# Patient Record
Sex: MALE | Race: BLACK OR AFRICAN AMERICAN | Employment: UNEMPLOYED | URBAN - METROPOLITAN AREA
[De-identification: names, ages, dates, MRNs, and addresses within clinical notes are randomized per-mention and may not be internally consistent; named-entity substitution may affect disease eponyms.]

---

## 2019-01-18 ENCOUNTER — HOSPITAL ENCOUNTER (EMERGENCY)
Age: 34
Discharge: HOME | End: 2019-01-19
Attending: EMERGENCY MEDICINE

## 2019-01-18 VITALS
HEART RATE: 72 BPM | WEIGHT: 180 LBS | OXYGEN SATURATION: 100 % | DIASTOLIC BLOOD PRESSURE: 77 MMHG | BODY MASS INDEX: 25.2 KG/M2 | SYSTOLIC BLOOD PRESSURE: 156 MMHG | HEIGHT: 71 IN | RESPIRATION RATE: 14 BRPM | TEMPERATURE: 97.6 F

## 2019-01-18 DIAGNOSIS — A60.01 HERPES SIMPLEX INFECTION OF PENIS: Primary | ICD-10-CM

## 2019-01-18 PROCEDURE — 99283 EMERGENCY DEPT VISIT LOW MDM: CPT

## 2019-01-18 PROCEDURE — 6370000000 HC RX 637 (ALT 250 FOR IP): Performed by: EMERGENCY MEDICINE

## 2019-01-18 RX ORDER — ACYCLOVIR 200 MG/1
200 CAPSULE ORAL ONCE
Status: COMPLETED | OUTPATIENT
Start: 2019-01-18 | End: 2019-01-18

## 2019-01-18 RX ORDER — ACYCLOVIR 400 MG/1
400 TABLET ORAL
Qty: 50 TABLET | Refills: 0 | Status: SHIPPED | OUTPATIENT
Start: 2019-01-18 | End: 2019-01-28

## 2019-01-18 RX ADMIN — ACYCLOVIR 200 MG: 200 CAPSULE ORAL at 23:30

## 2019-01-18 ASSESSMENT — ENCOUNTER SYMPTOMS
VOMITING: 0
DIARRHEA: 0
SINUS PRESSURE: 0
ABDOMINAL PAIN: 0
EYE DISCHARGE: 0
EYE PAIN: 0
COUGH: 0
SHORTNESS OF BREATH: 0
BACK PAIN: 0
SORE THROAT: 0
EYE REDNESS: 0
WHEEZING: 0
NAUSEA: 0

## 2019-01-18 ASSESSMENT — PAIN DESCRIPTION - PAIN TYPE: TYPE: ACUTE PAIN

## 2019-01-18 ASSESSMENT — PAIN DESCRIPTION - LOCATION: LOCATION: PENIS

## 2019-01-18 ASSESSMENT — PAIN SCALES - GENERAL: PAINLEVEL_OUTOF10: 10

## 2019-06-16 ENCOUNTER — HOSPITAL ENCOUNTER (EMERGENCY)
Age: 34
Discharge: HOME | End: 2019-06-16
Payer: MEDICAID

## 2019-06-16 VITALS
SYSTOLIC BLOOD PRESSURE: 139 MMHG | DIASTOLIC BLOOD PRESSURE: 100 MMHG | OXYGEN SATURATION: 99 % | WEIGHT: 180 LBS | HEART RATE: 75 BPM | BODY MASS INDEX: 25.1 KG/M2 | TEMPERATURE: 97.7 F | RESPIRATION RATE: 18 BRPM

## 2019-06-16 DIAGNOSIS — B00.9 HERPES SIMPLEX: ICD-10-CM

## 2019-06-16 DIAGNOSIS — R09.81 NASAL CONGESTION WITH RHINORRHEA: Primary | ICD-10-CM

## 2019-06-16 DIAGNOSIS — J34.89 NASAL CONGESTION WITH RHINORRHEA: Primary | ICD-10-CM

## 2019-06-16 LAB
BILIRUBIN URINE: NEGATIVE
BLOOD, URINE: NEGATIVE
CLARITY: CLEAR
COLOR: YELLOW
GLUCOSE URINE: NEGATIVE MG/DL
KETONES, URINE: NEGATIVE MG/DL
LEUKOCYTE ESTERASE, URINE: NEGATIVE
NITRITE, URINE: NEGATIVE
PH UA: 6 (ref 5–9)
PROTEIN UA: NEGATIVE MG/DL
SPECIFIC GRAVITY UA: 1.01 (ref 1–1.03)
UROBILINOGEN, URINE: 1 E.U./DL

## 2019-06-16 PROCEDURE — 99283 EMERGENCY DEPT VISIT LOW MDM: CPT

## 2019-06-16 PROCEDURE — 87591 N.GONORRHOEAE DNA AMP PROB: CPT

## 2019-06-16 PROCEDURE — 81003 URINALYSIS AUTO W/O SCOPE: CPT

## 2019-06-16 PROCEDURE — 87491 CHLMYD TRACH DNA AMP PROBE: CPT

## 2019-06-16 RX ORDER — LORATADINE 10 MG/1
10 TABLET ORAL DAILY
Qty: 7 TABLET | Refills: 0 | Status: SHIPPED | OUTPATIENT
Start: 2019-06-16 | End: 2019-06-23

## 2019-06-16 RX ORDER — LORATADINE 10 MG/1
10 TABLET ORAL DAILY
Qty: 7 TABLET | Refills: 0 | Status: SHIPPED | OUTPATIENT
Start: 2019-06-16 | End: 2019-06-16 | Stop reason: SDUPTHER

## 2019-06-16 RX ORDER — FLUTICASONE PROPIONATE 50 MCG
1 SPRAY, SUSPENSION (ML) NASAL DAILY
Qty: 1 BOTTLE | Refills: 0 | Status: SHIPPED | OUTPATIENT
Start: 2019-06-16

## 2019-06-16 RX ORDER — ACYCLOVIR 400 MG/1
400 TABLET ORAL
Qty: 50 TABLET | Refills: 0 | Status: SHIPPED | OUTPATIENT
Start: 2019-06-16 | End: 2019-06-26

## 2019-06-16 RX ORDER — ACYCLOVIR 400 MG/1
400 TABLET ORAL
Qty: 50 TABLET | Refills: 0 | Status: SHIPPED | OUTPATIENT
Start: 2019-06-16 | End: 2019-06-16 | Stop reason: SDUPTHER

## 2019-06-16 RX ORDER — FLUTICASONE PROPIONATE 50 MCG
1 SPRAY, SUSPENSION (ML) NASAL DAILY
Qty: 1 BOTTLE | Refills: 0 | Status: SHIPPED | OUTPATIENT
Start: 2019-06-16 | End: 2019-06-16 | Stop reason: SDUPTHER

## 2019-06-16 ASSESSMENT — PAIN DESCRIPTION - LOCATION: LOCATION: PENIS

## 2019-06-16 ASSESSMENT — PAIN DESCRIPTION - DESCRIPTORS: DESCRIPTORS: DISCOMFORT

## 2019-06-19 LAB
C. TRACHOMATIS DNA ,URINE: NEGATIVE
N. GONORRHOEAE DNA, URINE: NEGATIVE
SOURCE: NORMAL

## 2019-06-19 NOTE — ED PROVIDER NOTES
[facility]       Department of Emergency Medicine   ED  Provider Note  Admit Date/RoomTime: 6/16/2019  4:53 PM  ED Room: [place]  MRN: [id number]  Chief Complaint: Nose Problem (clear nasal drainage for 40 days. ) and Sexually Transmitted Diseases (Pt wants treated for herpes. )       History of Present Illness   Source of history provided by:  patient. History/Exam Limitations: none. Brianna August is a 29 y.o. male who has a past medical history of:   Past Medical History:   Diagnosis Date    Herpes genitalis in men     presents to the ED by private car and is alone for nasal congestion and rash, beginning pta and are unchanged since that time. The complaint has been persistent, moderate in severity. States for the 40 days he has had nasal drainage from the left nares. States clear. Denies fever, chills, nausea, vomiting or cough. Also states he has a history of herpes. States flare started several days ago. ROS    Pertinent positives and negatives are stated within HPI, all other systems reviewed and are negative. History reviewed. No pertinent surgical history. Social History:  reports that he has quit smoking. His smoking use included cigarettes. He smoked 0.50 packs per day. He has never used smokeless tobacco. He reports that he does not drink alcohol or use drugs. Family History: family history is not on file. Allergies: Ciprofloxacin and Penicillins    Physical Exam   Oxygen Saturation Interpretation: Normal.   ED Triage Vitals   BP Temp Temp Source Pulse Resp SpO2 Height Weight   06/16/19 1651 06/16/19 1651 06/16/19 1651 06/16/19 1646 06/16/19 1651 06/16/19 1646 -- 06/16/19 1651   (!) 139/100 97.7 °F (36.5 °C) Temporal 71 18 98 %  180 lb (81.6 kg)       Physical Exam  · Constitutional/General: Alert and oriented x3, well appearing, non toxic  · HEENT:  NC/NT. PERRLA,  Airway patent.   · Neck: Supple, full ROM, non tender to palpation in the midline, no stridor, no crepitus, no meningeal signs  · Respiratory: Lungs clear to auscultation bilaterally, no wheezes, rales, or rhonchi. Not in respiratory distress  · CV:  Regular rate. Regular rhythm. No murmurs, gallops, or rubs. 2+ distal pulses  · Chest: No chest wall tenderness  · GI:  Abdomen Soft, Non tender, Non distended. +BS. No rebound, guarding, or rigidity. No pulsatile masses. · Musculoskeletal: Moves all extremities x 4. Warm and well perfused, no clubbing, cyanosis, or edema. Capillary refill <3 seconds  · Integument: skin warm and dry. No rashes. · Lymphatic: no lymphadenopathy noted  · Neurologic: GCS 15, no focal deficits, symmetric strength 5/5 in the upper and lower extremities bilaterally  · Psychiatric: Normal Affect    Lab / Imaging Results   (All laboratory and radiology results have been personally reviewed by myself)  Labs:  Results for orders placed or performed during the hospital encounter of 06/16/19   C.trachomatis N.gonorrhoeae DNA, Urine   Result Value Ref Range    Source Urine    Urinalysis   Result Value Ref Range    Color, UA Yellow Straw/Yellow    Clarity, UA Clear Clear    Glucose, Ur Negative Negative mg/dL    Bilirubin Urine Negative Negative    Ketones, Urine Negative Negative mg/dL    Specific Gravity, UA 1.015 1.005 - 1.030    Blood, Urine Negative Negative    pH, UA 6.0 5.0 - 9.0    Protein, UA Negative Negative mg/dL    Urobilinogen, Urine 1.0 <2.0 E.U./dL    Nitrite, Urine Negative Negative    Leukocyte Esterase, Urine Negative Negative     Imaging: All Radiology results interpreted by Radiologist unless otherwise noted. No orders to display       ED Course / Medical Decision Making   Medications - No data to display     Consult(s):   None    Procedure(s):   none    MDM:   Patient in no acute distress. Vital signs stable. Treated with acyclovir, flonase and claritin. Declined exam.  Patient will follow with PCP. Educated on the signs and symptoms to return to the ED. Discharged in stable condition.

## 2023-02-05 ENCOUNTER — APPOINTMENT (OUTPATIENT)
Dept: GENERAL RADIOLOGY | Age: 38
DRG: 313 | End: 2023-02-05
Payer: MEDICAID

## 2023-02-05 ENCOUNTER — HOSPITAL ENCOUNTER (INPATIENT)
Age: 38
LOS: 1 days | Discharge: HOME | DRG: 313 | End: 2023-02-06
Attending: EMERGENCY MEDICINE | Admitting: ORTHOPAEDIC SURGERY
Payer: MEDICAID

## 2023-02-05 ENCOUNTER — APPOINTMENT (OUTPATIENT)
Dept: CT IMAGING | Age: 38
DRG: 313 | End: 2023-02-05
Payer: MEDICAID

## 2023-02-05 DIAGNOSIS — S82.142A CLOSED FRACTURE OF LEFT TIBIAL PLATEAU, INITIAL ENCOUNTER: Primary | ICD-10-CM

## 2023-02-05 DIAGNOSIS — S82.142A TIBIAL PLATEAU FRACTURE, LEFT, CLOSED, INITIAL ENCOUNTER: ICD-10-CM

## 2023-02-05 PROCEDURE — 6360000002 HC RX W HCPCS: Performed by: NURSE PRACTITIONER

## 2023-02-05 PROCEDURE — 99254 IP/OBS CNSLTJ NEW/EST MOD 60: CPT | Performed by: ORTHOPAEDIC SURGERY

## 2023-02-05 PROCEDURE — 72125 CT NECK SPINE W/O DYE: CPT

## 2023-02-05 PROCEDURE — 96374 THER/PROPH/DIAG INJ IV PUSH: CPT

## 2023-02-05 PROCEDURE — 73700 CT LOWER EXTREMITY W/O DYE: CPT

## 2023-02-05 PROCEDURE — 73590 X-RAY EXAM OF LOWER LEG: CPT

## 2023-02-05 PROCEDURE — 96376 TX/PRO/DX INJ SAME DRUG ADON: CPT

## 2023-02-05 PROCEDURE — 73560 X-RAY EXAM OF KNEE 1 OR 2: CPT

## 2023-02-05 PROCEDURE — 73552 X-RAY EXAM OF FEMUR 2/>: CPT

## 2023-02-05 PROCEDURE — 99285 EMERGENCY DEPT VISIT HI MDM: CPT

## 2023-02-05 PROCEDURE — 70450 CT HEAD/BRAIN W/O DYE: CPT

## 2023-02-05 RX ORDER — METHOCARBAMOL 750 MG/1
750 TABLET, FILM COATED ORAL 4 TIMES DAILY
Status: DISCONTINUED | OUTPATIENT
Start: 2023-02-05 | End: 2023-02-10 | Stop reason: HOSPADM

## 2023-02-05 RX ORDER — OXYCODONE HYDROCHLORIDE 10 MG/1
10 TABLET ORAL EVERY 4 HOURS PRN
Status: DISCONTINUED | OUTPATIENT
Start: 2023-02-05 | End: 2023-02-10 | Stop reason: HOSPADM

## 2023-02-05 RX ORDER — OXYCODONE HYDROCHLORIDE 5 MG/1
5 TABLET ORAL EVERY 4 HOURS PRN
Status: DISCONTINUED | OUTPATIENT
Start: 2023-02-05 | End: 2023-02-10 | Stop reason: HOSPADM

## 2023-02-05 RX ORDER — FENTANYL CITRATE 50 UG/ML
50 INJECTION, SOLUTION INTRAMUSCULAR; INTRAVENOUS ONCE
Status: COMPLETED | OUTPATIENT
Start: 2023-02-05 | End: 2023-02-05

## 2023-02-05 RX ORDER — GABAPENTIN 100 MG/1
200 CAPSULE ORAL 3 TIMES DAILY
Status: DISCONTINUED | OUTPATIENT
Start: 2023-02-05 | End: 2023-02-10 | Stop reason: HOSPADM

## 2023-02-05 RX ADMIN — FENTANYL CITRATE 50 MCG: 0.05 INJECTION, SOLUTION INTRAMUSCULAR; INTRAVENOUS at 21:16

## 2023-02-05 RX ADMIN — FENTANYL CITRATE 50 MCG: 50 INJECTION, SOLUTION INTRAMUSCULAR; INTRAVENOUS at 22:58

## 2023-02-05 ASSESSMENT — PAIN - FUNCTIONAL ASSESSMENT
PAIN_FUNCTIONAL_ASSESSMENT: 0-10
PAIN_FUNCTIONAL_ASSESSMENT: 0-10

## 2023-02-05 ASSESSMENT — PAIN SCALES - GENERAL
PAINLEVEL_OUTOF10: 10
PAINLEVEL_OUTOF10: 10
PAINLEVEL_OUTOF10: 7

## 2023-02-05 ASSESSMENT — PAIN DESCRIPTION - ORIENTATION
ORIENTATION: LEFT

## 2023-02-05 ASSESSMENT — LIFESTYLE VARIABLES: HOW OFTEN DO YOU HAVE A DRINK CONTAINING ALCOHOL: NEVER

## 2023-02-05 ASSESSMENT — PAIN DESCRIPTION - LOCATION
LOCATION: KNEE

## 2023-02-06 ENCOUNTER — ANESTHESIA EVENT (OUTPATIENT)
Dept: OPERATING ROOM | Age: 38
DRG: 313 | End: 2023-02-06
Payer: MEDICAID

## 2023-02-06 ENCOUNTER — ANESTHESIA (OUTPATIENT)
Dept: OPERATING ROOM | Age: 38
DRG: 313 | End: 2023-02-06
Payer: MEDICAID

## 2023-02-06 ENCOUNTER — APPOINTMENT (OUTPATIENT)
Dept: GENERAL RADIOLOGY | Age: 38
DRG: 313 | End: 2023-02-06
Payer: MEDICAID

## 2023-02-06 VITALS
HEIGHT: 72 IN | DIASTOLIC BLOOD PRESSURE: 93 MMHG | TEMPERATURE: 98 F | SYSTOLIC BLOOD PRESSURE: 140 MMHG | RESPIRATION RATE: 25 BRPM | OXYGEN SATURATION: 98 % | HEART RATE: 77 BPM | BODY MASS INDEX: 25.06 KG/M2 | WEIGHT: 185 LBS

## 2023-02-06 PROBLEM — S82.142A TIBIAL PLATEAU FRACTURE, LEFT, CLOSED, INITIAL ENCOUNTER: Status: ACTIVE | Noted: 2023-02-06

## 2023-02-06 LAB
ABO/RH: NORMAL
ALBUMIN SERPL-MCNC: 4.4 G/DL (ref 3.5–5.2)
ALP BLD-CCNC: 57 U/L (ref 40–129)
ALT SERPL-CCNC: 22 U/L (ref 0–40)
ANION GAP SERPL CALCULATED.3IONS-SCNC: 16 MMOL/L (ref 7–16)
ANTIBODY SCREEN: NORMAL
APTT: 32 SEC (ref 24.5–35.1)
AST SERPL-CCNC: 32 U/L (ref 0–39)
BASOPHILS ABSOLUTE: 0.05 E9/L (ref 0–0.2)
BASOPHILS RELATIVE PERCENT: 0.4 % (ref 0–2)
BILIRUB SERPL-MCNC: 2.3 MG/DL (ref 0–1.2)
BUN BLDV-MCNC: 13 MG/DL (ref 6–20)
CALCIUM SERPL-MCNC: 8.8 MG/DL (ref 8.6–10.2)
CHLORIDE BLD-SCNC: 103 MMOL/L (ref 98–107)
CO2: 19 MMOL/L (ref 22–29)
CREAT SERPL-MCNC: 0.7 MG/DL (ref 0.7–1.2)
EOSINOPHILS ABSOLUTE: 0.01 E9/L (ref 0.05–0.5)
EOSINOPHILS RELATIVE PERCENT: 0.1 % (ref 0–6)
GFR SERPL CREATININE-BSD FRML MDRD: >60 ML/MIN/1.73
GLUCOSE BLD-MCNC: 78 MG/DL (ref 74–99)
HCT VFR BLD CALC: 44.8 % (ref 37–54)
HEMOGLOBIN: 15.7 G/DL (ref 12.5–16.5)
IMMATURE GRANULOCYTES #: 0.05 E9/L
IMMATURE GRANULOCYTES %: 0.4 % (ref 0–5)
INR BLD: 1.3
LYMPHOCYTES ABSOLUTE: 1.06 E9/L (ref 1.5–4)
LYMPHOCYTES RELATIVE PERCENT: 9.5 % (ref 20–42)
MCH RBC QN AUTO: 31.6 PG (ref 26–35)
MCHC RBC AUTO-ENTMCNC: 35 % (ref 32–34.5)
MCV RBC AUTO: 90.1 FL (ref 80–99.9)
MONOCYTES ABSOLUTE: 1.34 E9/L (ref 0.1–0.95)
MONOCYTES RELATIVE PERCENT: 12 % (ref 2–12)
NEUTROPHILS ABSOLUTE: 8.63 E9/L (ref 1.8–7.3)
NEUTROPHILS RELATIVE PERCENT: 77.6 % (ref 43–80)
PDW BLD-RTO: 12.4 FL (ref 11.5–15)
PLATELET # BLD: 174 E9/L (ref 130–450)
PMV BLD AUTO: 9.5 FL (ref 7–12)
POTASSIUM SERPL-SCNC: 3.9 MMOL/L (ref 3.5–5)
PROTHROMBIN TIME: 14.1 SEC (ref 9.3–12.4)
RBC # BLD: 4.97 E12/L (ref 3.8–5.8)
SODIUM BLD-SCNC: 138 MMOL/L (ref 132–146)
TOTAL PROTEIN: 7.8 G/DL (ref 6.4–8.3)
WBC # BLD: 11.1 E9/L (ref 4.5–11.5)

## 2023-02-06 PROCEDURE — 86850 RBC ANTIBODY SCREEN: CPT

## 2023-02-06 PROCEDURE — 3600000005 HC SURGERY LEVEL 5 BASE: Performed by: ORTHOPAEDIC SURGERY

## 2023-02-06 PROCEDURE — 2709999900 HC NON-CHARGEABLE SUPPLY: Performed by: ORTHOPAEDIC SURGERY

## 2023-02-06 PROCEDURE — 80053 COMPREHEN METABOLIC PANEL: CPT

## 2023-02-06 PROCEDURE — 85730 THROMBOPLASTIN TIME PARTIAL: CPT

## 2023-02-06 PROCEDURE — 6360000002 HC RX W HCPCS

## 2023-02-06 PROCEDURE — 64445 NJX AA&/STRD SCIATIC NRV IMG: CPT | Performed by: ANESTHESIOLOGY

## 2023-02-06 PROCEDURE — G0378 HOSPITAL OBSERVATION PER HR: HCPCS

## 2023-02-06 PROCEDURE — 6360000002 HC RX W HCPCS: Performed by: ANESTHESIOLOGY

## 2023-02-06 PROCEDURE — 3700000001 HC ADD 15 MINUTES (ANESTHESIA): Performed by: ORTHOPAEDIC SURGERY

## 2023-02-06 PROCEDURE — 85025 COMPLETE CBC W/AUTO DIFF WBC: CPT

## 2023-02-06 PROCEDURE — 0QSH04Z REPOSITION LEFT TIBIA WITH INTERNAL FIXATION DEVICE, OPEN APPROACH: ICD-10-PCS | Performed by: ORTHOPAEDIC SURGERY

## 2023-02-06 PROCEDURE — 3600000015 HC SURGERY LEVEL 5 ADDTL 15MIN: Performed by: ORTHOPAEDIC SURGERY

## 2023-02-06 PROCEDURE — 3209999900 FLUORO FOR SURGICAL PROCEDURES

## 2023-02-06 PROCEDURE — 86901 BLOOD TYPING SEROLOGIC RH(D): CPT

## 2023-02-06 PROCEDURE — 85610 PROTHROMBIN TIME: CPT

## 2023-02-06 PROCEDURE — 7100000001 HC PACU RECOVERY - ADDTL 15 MIN: Performed by: ORTHOPAEDIC SURGERY

## 2023-02-06 PROCEDURE — 2500000003 HC RX 250 WO HCPCS

## 2023-02-06 PROCEDURE — 64447 NJX AA&/STRD FEMORAL NRV IMG: CPT | Performed by: ANESTHESIOLOGY

## 2023-02-06 PROCEDURE — 2580000003 HC RX 258

## 2023-02-06 PROCEDURE — 86900 BLOOD TYPING SEROLOGIC ABO: CPT

## 2023-02-06 PROCEDURE — 6370000000 HC RX 637 (ALT 250 FOR IP)

## 2023-02-06 PROCEDURE — 73560 X-RAY EXAM OF KNEE 1 OR 2: CPT

## 2023-02-06 PROCEDURE — 27535 TREAT KNEE FRACTURE: CPT | Performed by: ORTHOPAEDIC SURGERY

## 2023-02-06 PROCEDURE — 7100000000 HC PACU RECOVERY - FIRST 15 MIN: Performed by: ORTHOPAEDIC SURGERY

## 2023-02-06 PROCEDURE — C1713 ANCHOR/SCREW BN/BN,TIS/BN: HCPCS | Performed by: ORTHOPAEDIC SURGERY

## 2023-02-06 PROCEDURE — 2720000010 HC SURG SUPPLY STERILE: Performed by: ORTHOPAEDIC SURGERY

## 2023-02-06 PROCEDURE — 0SQD0ZZ REPAIR LEFT KNEE JOINT, OPEN APPROACH: ICD-10-PCS | Performed by: ORTHOPAEDIC SURGERY

## 2023-02-06 PROCEDURE — 2500000003 HC RX 250 WO HCPCS: Performed by: NURSE ANESTHETIST, CERTIFIED REGISTERED

## 2023-02-06 PROCEDURE — 3700000000 HC ANESTHESIA ATTENDED CARE: Performed by: ORTHOPAEDIC SURGERY

## 2023-02-06 PROCEDURE — 6360000002 HC RX W HCPCS: Performed by: NURSE ANESTHETIST, CERTIFIED REGISTERED

## 2023-02-06 PROCEDURE — 27403 REPAIR OF KNEE CARTILAGE: CPT | Performed by: ORTHOPAEDIC SURGERY

## 2023-02-06 PROCEDURE — 96376 TX/PRO/DX INJ SAME DRUG ADON: CPT

## 2023-02-06 PROCEDURE — 1200000000 HC SEMI PRIVATE

## 2023-02-06 DEVICE — SCREW BNE L48MM DIA3.5MM CORT S STL ST NONCANNULATED LOK: Type: IMPLANTABLE DEVICE | Site: TIBIA | Status: FUNCTIONAL

## 2023-02-06 DEVICE — SCREW BNE L75MM DIA3.5MM CORT S STL ST LOK FULL THRD T15: Type: IMPLANTABLE DEVICE | Site: TIBIA | Status: FUNCTIONAL

## 2023-02-06 DEVICE — PLATE BNE L102MM 6 H ST L PROX BILAT TIB S STL NEUT LOK: Type: IMPLANTABLE DEVICE | Site: TIBIA | Status: FUNCTIONAL

## 2023-02-06 DEVICE — SCREW BNE L46MM DIA3.5MM CORT S STL ST NONCANNULATED LOK: Type: IMPLANTABLE DEVICE | Site: TIBIA | Status: FUNCTIONAL

## 2023-02-06 DEVICE — SCREW BNE L60MM DIA3.5MM CORT S STL ST LOK FULL THRD: Type: IMPLANTABLE DEVICE | Site: TIBIA | Status: FUNCTIONAL

## 2023-02-06 DEVICE — SCREW BNE L42MM DIA3.5MM CORT S STL ST NONCANNULATED LOK: Type: IMPLANTABLE DEVICE | Site: TIBIA | Status: FUNCTIONAL

## 2023-02-06 DEVICE — SCREW BNE L44MM DIA3.5MM CORT S STL ST NONCANNULATED LOK: Type: IMPLANTABLE DEVICE | Site: TIBIA | Status: FUNCTIONAL

## 2023-02-06 DEVICE — SCREW BNE L80MM DIA3.5MM CORT S STL ST LOK FULL THRD: Type: IMPLANTABLE DEVICE | Site: TIBIA | Status: FUNCTIONAL

## 2023-02-06 RX ORDER — FENTANYL CITRATE 50 UG/ML
INJECTION, SOLUTION INTRAMUSCULAR; INTRAVENOUS PRN
Status: DISCONTINUED | OUTPATIENT
Start: 2023-02-06 | End: 2023-02-06 | Stop reason: SDUPTHER

## 2023-02-06 RX ORDER — SODIUM CHLORIDE 9 MG/ML
INJECTION, SOLUTION INTRAVENOUS CONTINUOUS PRN
Status: DISCONTINUED | OUTPATIENT
Start: 2023-02-06 | End: 2023-02-06 | Stop reason: SDUPTHER

## 2023-02-06 RX ORDER — ONDANSETRON 2 MG/ML
4 INJECTION INTRAMUSCULAR; INTRAVENOUS
Status: DISCONTINUED | OUTPATIENT
Start: 2023-02-06 | End: 2023-02-07 | Stop reason: HOSPADM

## 2023-02-06 RX ORDER — MEPERIDINE HYDROCHLORIDE 25 MG/ML
12.5 INJECTION INTRAMUSCULAR; INTRAVENOUS; SUBCUTANEOUS
Status: DISCONTINUED | OUTPATIENT
Start: 2023-02-06 | End: 2023-02-10 | Stop reason: HOSPADM

## 2023-02-06 RX ORDER — DEXAMETHASONE SODIUM PHOSPHATE 10 MG/ML
INJECTION INTRAMUSCULAR; INTRAVENOUS PRN
Status: DISCONTINUED | OUTPATIENT
Start: 2023-02-06 | End: 2023-02-06 | Stop reason: SDUPTHER

## 2023-02-06 RX ORDER — OXYCODONE HYDROCHLORIDE AND ACETAMINOPHEN 5; 325 MG/1; MG/1
1 TABLET ORAL EVERY 6 HOURS PRN
Qty: 28 TABLET | Refills: 0 | Status: SHIPPED | OUTPATIENT
Start: 2023-02-06 | End: 2023-02-06 | Stop reason: SDUPTHER

## 2023-02-06 RX ORDER — SODIUM CHLORIDE 0.9 % (FLUSH) 0.9 %
5-40 SYRINGE (ML) INJECTION EVERY 12 HOURS SCHEDULED
Status: DISCONTINUED | OUTPATIENT
Start: 2023-02-06 | End: 2023-02-10 | Stop reason: HOSPADM

## 2023-02-06 RX ORDER — SODIUM CHLORIDE 9 MG/ML
INJECTION, SOLUTION INTRAVENOUS PRN
Status: DISCONTINUED | OUTPATIENT
Start: 2023-02-06 | End: 2023-02-10 | Stop reason: HOSPADM

## 2023-02-06 RX ORDER — ONDANSETRON 2 MG/ML
INJECTION INTRAMUSCULAR; INTRAVENOUS PRN
Status: DISCONTINUED | OUTPATIENT
Start: 2023-02-06 | End: 2023-02-06 | Stop reason: SDUPTHER

## 2023-02-06 RX ORDER — DEXAMETHASONE SODIUM PHOSPHATE 10 MG/ML
INJECTION, SOLUTION INTRAMUSCULAR; INTRAVENOUS
Status: COMPLETED
Start: 2023-02-06 | End: 2023-02-06

## 2023-02-06 RX ORDER — OXYCODONE HYDROCHLORIDE AND ACETAMINOPHEN 5; 325 MG/1; MG/1
1 TABLET ORAL EVERY 6 HOURS PRN
Qty: 28 TABLET | Refills: 0 | Status: SHIPPED | OUTPATIENT
Start: 2023-02-06 | End: 2023-02-06 | Stop reason: HOSPADM

## 2023-02-06 RX ORDER — SODIUM CHLORIDE 0.9 % (FLUSH) 0.9 %
5-40 SYRINGE (ML) INJECTION PRN
Status: DISCONTINUED | OUTPATIENT
Start: 2023-02-06 | End: 2023-02-10 | Stop reason: HOSPADM

## 2023-02-06 RX ORDER — PROPOFOL 10 MG/ML
INJECTION, EMULSION INTRAVENOUS PRN
Status: DISCONTINUED | OUTPATIENT
Start: 2023-02-06 | End: 2023-02-06 | Stop reason: SDUPTHER

## 2023-02-06 RX ORDER — ROPIVACAINE HYDROCHLORIDE 5 MG/ML
INJECTION, SOLUTION EPIDURAL; INFILTRATION; PERINEURAL
Status: COMPLETED | OUTPATIENT
Start: 2023-02-06 | End: 2023-02-06

## 2023-02-06 RX ORDER — LIDOCAINE HYDROCHLORIDE 20 MG/ML
INJECTION, SOLUTION INTRAVENOUS PRN
Status: DISCONTINUED | OUTPATIENT
Start: 2023-02-06 | End: 2023-02-06 | Stop reason: SDUPTHER

## 2023-02-06 RX ORDER — CEPHALEXIN 500 MG/1
500 CAPSULE ORAL 4 TIMES DAILY
Qty: 28 CAPSULE | Refills: 0 | Status: SHIPPED | OUTPATIENT
Start: 2023-02-06 | End: 2023-02-09

## 2023-02-06 RX ORDER — ROPIVACAINE HYDROCHLORIDE 5 MG/ML
30 INJECTION, SOLUTION EPIDURAL; INFILTRATION; PERINEURAL ONCE
Status: CANCELLED | OUTPATIENT
Start: 2023-02-06 | End: 2023-02-06

## 2023-02-06 RX ORDER — MIDAZOLAM HYDROCHLORIDE 2 MG/2ML
1 INJECTION, SOLUTION INTRAMUSCULAR; INTRAVENOUS PRN
Status: DISCONTINUED | OUTPATIENT
Start: 2023-02-06 | End: 2023-02-06 | Stop reason: HOSPADM

## 2023-02-06 RX ORDER — MIDAZOLAM HYDROCHLORIDE 1 MG/ML
INJECTION INTRAMUSCULAR; INTRAVENOUS
Status: COMPLETED
Start: 2023-02-06 | End: 2023-02-06

## 2023-02-06 RX ORDER — DEXAMETHASONE SODIUM PHOSPHATE 10 MG/ML
4 INJECTION, EMULSION INTRAMUSCULAR; INTRAVENOUS ONCE
Status: CANCELLED | OUTPATIENT
Start: 2023-02-06 | End: 2023-02-06

## 2023-02-06 RX ORDER — MIDAZOLAM HYDROCHLORIDE 2 MG/2ML
1 INJECTION, SOLUTION INTRAMUSCULAR; INTRAVENOUS PRN
Status: CANCELLED | OUTPATIENT
Start: 2023-02-06

## 2023-02-06 RX ORDER — FENTANYL CITRATE 50 UG/ML
100 INJECTION, SOLUTION INTRAMUSCULAR; INTRAVENOUS ONCE
Status: COMPLETED | OUTPATIENT
Start: 2023-02-06 | End: 2023-02-06

## 2023-02-06 RX ORDER — ROPIVACAINE HYDROCHLORIDE 5 MG/ML
INJECTION, SOLUTION EPIDURAL; INFILTRATION; PERINEURAL
Status: COMPLETED
Start: 2023-02-06 | End: 2023-02-06

## 2023-02-06 RX ORDER — ROCURONIUM BROMIDE 10 MG/ML
INJECTION, SOLUTION INTRAVENOUS PRN
Status: DISCONTINUED | OUTPATIENT
Start: 2023-02-06 | End: 2023-02-06 | Stop reason: SDUPTHER

## 2023-02-06 RX ORDER — ROPIVACAINE HYDROCHLORIDE 5 MG/ML
30 INJECTION, SOLUTION EPIDURAL; INFILTRATION; PERINEURAL ONCE
Status: COMPLETED | OUTPATIENT
Start: 2023-02-06 | End: 2023-02-06

## 2023-02-06 RX ORDER — DEXAMETHASONE SODIUM PHOSPHATE 10 MG/ML
4 INJECTION, SOLUTION INTRAMUSCULAR; INTRAVENOUS ONCE
Status: COMPLETED | OUTPATIENT
Start: 2023-02-06 | End: 2023-02-06

## 2023-02-06 RX ORDER — CEPHALEXIN 500 MG/1
500 CAPSULE ORAL 4 TIMES DAILY
Qty: 28 CAPSULE | Refills: 0 | Status: SHIPPED | OUTPATIENT
Start: 2023-02-06 | End: 2023-02-06 | Stop reason: SDUPTHER

## 2023-02-06 RX ADMIN — DEXAMETHASONE SODIUM PHOSPHATE 4 MG: 10 INJECTION INTRAMUSCULAR; INTRAVENOUS at 12:16

## 2023-02-06 RX ADMIN — DEXAMETHASONE SODIUM PHOSPHATE 10 MG: 10 INJECTION INTRAMUSCULAR; INTRAVENOUS at 12:59

## 2023-02-06 RX ADMIN — MIDAZOLAM HYDROCHLORIDE 2 MG: 2 INJECTION, SOLUTION INTRAMUSCULAR; INTRAVENOUS at 12:15

## 2023-02-06 RX ADMIN — DEXAMETHASONE SODIUM PHOSPHATE 4 MG: 10 INJECTION, SOLUTION INTRAMUSCULAR; INTRAVENOUS at 12:16

## 2023-02-06 RX ADMIN — LIDOCAINE HYDROCHLORIDE 40 MG: 20 INJECTION, SOLUTION INTRAVENOUS at 12:48

## 2023-02-06 RX ADMIN — FENTANYL CITRATE 50 MCG: 50 INJECTION, SOLUTION INTRAMUSCULAR; INTRAVENOUS at 12:55

## 2023-02-06 RX ADMIN — SODIUM CHLORIDE: 9 INJECTION, SOLUTION INTRAVENOUS at 12:42

## 2023-02-06 RX ADMIN — SODIUM CHLORIDE: 9 INJECTION, SOLUTION INTRAVENOUS at 13:25

## 2023-02-06 RX ADMIN — ROPIVACAINE HYDROCHLORIDE 30 ML: 5 INJECTION, SOLUTION EPIDURAL; INFILTRATION; PERINEURAL at 12:16

## 2023-02-06 RX ADMIN — OXYCODONE HYDROCHLORIDE 10 MG: 10 TABLET ORAL at 11:26

## 2023-02-06 RX ADMIN — ROPIVACAINE HYDROCHLORIDE 30 ML: 5 INJECTION EPIDURAL; INFILTRATION; PERINEURAL at 12:19

## 2023-02-06 RX ADMIN — FENTANYL CITRATE 100 MCG: 50 INJECTION, SOLUTION INTRAMUSCULAR; INTRAVENOUS at 01:01

## 2023-02-06 RX ADMIN — ROCURONIUM BROMIDE 50 MG: 10 INJECTION, SOLUTION INTRAVENOUS at 12:49

## 2023-02-06 RX ADMIN — FENTANYL CITRATE 50 MCG: 50 INJECTION, SOLUTION INTRAMUSCULAR; INTRAVENOUS at 13:06

## 2023-02-06 RX ADMIN — GABAPENTIN 200 MG: 100 CAPSULE ORAL at 11:26

## 2023-02-06 RX ADMIN — FENTANYL CITRATE 100 MCG: 50 INJECTION, SOLUTION INTRAMUSCULAR; INTRAVENOUS at 12:48

## 2023-02-06 RX ADMIN — ROPIVACAINE HYDROCHLORIDE 30 ML: 5 INJECTION EPIDURAL; INFILTRATION; PERINEURAL at 12:16

## 2023-02-06 RX ADMIN — METHOCARBAMOL 750 MG: 750 TABLET ORAL at 00:46

## 2023-02-06 RX ADMIN — PROPOFOL 200 MG: 10 INJECTION, EMULSION INTRAVENOUS at 12:48

## 2023-02-06 RX ADMIN — OXYCODONE HYDROCHLORIDE 10 MG: 10 TABLET ORAL at 00:46

## 2023-02-06 RX ADMIN — FENTANYL CITRATE 50 MCG: 50 INJECTION, SOLUTION INTRAMUSCULAR; INTRAVENOUS at 13:05

## 2023-02-06 RX ADMIN — GABAPENTIN 200 MG: 100 CAPSULE ORAL at 00:46

## 2023-02-06 RX ADMIN — ONDANSETRON 4 MG: 2 INJECTION INTRAMUSCULAR; INTRAVENOUS at 14:20

## 2023-02-06 RX ADMIN — MIDAZOLAM 2 MG: 1 INJECTION INTRAMUSCULAR; INTRAVENOUS at 12:15

## 2023-02-06 RX ADMIN — OXYCODONE HYDROCHLORIDE 10 MG: 10 TABLET ORAL at 11:07

## 2023-02-06 RX ADMIN — ROPIVACAINE HYDROCHLORIDE 30 ML: 5 INJECTION, SOLUTION EPIDURAL; INFILTRATION; PERINEURAL at 12:26

## 2023-02-06 RX ADMIN — CEFAZOLIN 2000 MG: 2 INJECTION, POWDER, FOR SOLUTION INTRAMUSCULAR; INTRAVENOUS at 12:59

## 2023-02-06 RX ADMIN — ROCURONIUM BROMIDE 20 MG: 10 INJECTION, SOLUTION INTRAVENOUS at 13:31

## 2023-02-06 RX ADMIN — SUGAMMADEX 168 MG: 100 INJECTION, SOLUTION INTRAVENOUS at 14:23

## 2023-02-06 ASSESSMENT — PAIN SCALES - GENERAL
PAINLEVEL_OUTOF10: 10
PAINLEVEL_OUTOF10: 8

## 2023-02-06 NOTE — ED PROVIDER NOTES
ED Physician   HPI:  2/5/23, Time: 9:20 PM MAINE Damon is a 40 y.o. male presenting to the ED for fall with left leg pain. Patient presents to the emergency department by car after having a mechanical fall landing directly on his left knee. It is reported that patient was outside tripped and fell over one of his chickens causing him to land directly on his left knee on concrete. Patient reports that he tried to break his fall with his hands out but still struck his knees but also struck his forehead. He denies loss of consciousness, he is not on any anticoagulant therapy. States that this occurred just 1 hour prior to arrival.  Patient on arrival unable to bear any weight on the left lower extremity and noted to be in moderate amount of pain. Patient denies any other areas of injury does have some abrasions noted to the dorsal aspect of the right hand but does have full movement otherwise. No associated pain to his lumbar spine or hips. Patient denies take anything for pain relief. Symptoms moderate in severity and persistent    Review of Systems:   A complete review of systems was performed and pertinent positives and negatives are stated within HPI, all other systems reviewed and are negative.          --------------------------------------------- PAST HISTORY ---------------------------------------------  Past Medical History:  has a past medical history of Herpes genitalis in men. Past Surgical History:  has no past surgical history on file. Social History:  reports that he has quit smoking. His smoking use included cigarettes. He smoked an average of .5 packs per day. He has never used smokeless tobacco. He reports that he does not drink alcohol and does not use drugs. Family History: family history is not on file. The patients home medications have been reviewed.     Allergies: Ciprofloxacin and Penicillins    -------------------------------------------------- RESULTS -------------------------------------------------  All laboratory and radiology results have been personally reviewed by myself   LABS:  No results found for this visit on 02/05/23. RADIOLOGY:  Interpreted by RadiologistOtis Martell   Final Result   Comminuted lateral tibial plateau fracture with displacement and depression. CT HEAD WO CONTRAST   Final Result   Head CT: No acute intracranial abnormality. No evidence for acute   intracranial hemorrhage, territorial infarction or intracranial mass lesion. Severe mucosal thickening of frontal sinuses and ethmoidal air cells. Cervical CT: No acute abnormality of the cervical spine. No fracture. CT CERVICAL SPINE WO CONTRAST   Final Result   Head CT: No acute intracranial abnormality. No evidence for acute   intracranial hemorrhage, territorial infarction or intracranial mass lesion. Severe mucosal thickening of frontal sinuses and ethmoidal air cells. Cervical CT: No acute abnormality of the cervical spine. No fracture. XR TIBIA FIBULA LEFT (2 VIEWS)   Final Result   Proximal tibia intra-articular fracture involving lateral plateau at least.      RECOMMENDATION:   CT if clinically indicated to further evaluate. XR KNEE LEFT (1-2 VIEWS)   Final Result   Proximal tibia intra-articular fracture involving lateral plateau at least.      RECOMMENDATION:   CT if clinically indicated to further evaluate. XR FEMUR LEFT (MIN 2 VIEWS)   Final Result   No acute femoral fracture.             ------------------------- NURSING NOTES AND VITALS REVIEWED ---------------------------   The nursing notes within the ED encounter and vital signs as below have been reviewed.    Pulse (!) 107   Temp 98 °F (36.7 °C)   Resp 20   Ht 6' (1.829 m)   Wt 185 lb (83.9 kg)   SpO2 97%   BMI 25.09 kg/m²   Oxygen Saturation Interpretation: Normal      ---------------------------------------------------PHYSICAL EXAM--------------------------------------      Constitutional/General: Alert and oriented x3, moderately uncomfortable  Head: Normocephalic and atraumatic  Eyes: PERRL, EOMI  Mouth: Oropharynx clear, handling secretions, no trismus  Neck: Supple, full ROM,   Pulmonary: Lungs clear to auscultation bilaterally, no wheezes, rales, or rhonchi. Not in respiratory distress  Cardiovascular:  Regular rate and rhythm, no murmurs, gallops, or rubs. 2+ distal pulses  Abdomen: Soft, non tender, non distended,   Extremities: Moves all extremities x 3. Warm and well perfused. Patient with notable deformity and questionable dislocation to the left distal femur into the left patella with notable displacement of the left patella. Left dorsal pedal pulse strong at 2+. No break noted in skin integrity. Skin: warm and dry without rash, abrasions to right hand. Neurologic: GCS 15, cranial nerves II through XII grossly intact. No acute neurovascular deficit noted. Speech clear and coherent strength strong and equal bilaterally  Psych: Normal Affect      ------------------------------ ED COURSE/MEDICAL DECISION MAKING----------------------  Medications   methocarbamol (ROBAXIN) tablet 750 mg (has no administration in time range)   gabapentin (NEURONTIN) capsule 200 mg (has no administration in time range)   oxyCODONE (ROXICODONE) immediate release tablet 5 mg (has no administration in time range)     Or   oxyCODONE HCl (OXY-IR) immediate release tablet 10 mg (has no administration in time range)   fentaNYL (SUBLIMAZE) injection 50 mcg (50 mcg IntraVENous Given 2/5/23 2116)   fentaNYL (SUBLIMAZE) injection 50 mcg (50 mcg IntraVENous Given 2/5/23 2258)         ED COURSE:       Medical Decision Making:  Corrinne Corns is a 40 y.o. male presenting to the ED for fall with left leg pain. Patient presents to the emergency department by car after having a mechanical fall landing directly on his left knee.   It is reported that patient was outside tripped and fell over one of his chickens causing him to land directly on his left knee on concrete. Patient reports that he tried to break his fall with his hands out but still struck his knees but also struck his forehead. He denies loss of consciousness, he is not on any anticoagulant therapy. States that this occurred just 1 hour prior to arrival.  Patient on arrival unable to bear any weight on the left lower extremity and noted to be in moderate amount of pain. Patient denies any other areas of injury does have some abrasions noted to the dorsal aspect of the right hand but does have full movement otherwise. No associated pain to his lumbar spine or hips. Patient denies take anything for pain relief. Symptoms moderate in severity and persistent. Differential diagnosis includes patella fracture versus patella dislocation versus distal femur fracture versus proximal tibial fibula comminuted fracture. Plan will be to obtain imaging. We will also provide patient with IV fentanyl 50 mics for acute pain relief so imaging can be obtained. .  Patient is starting to get significant relief with the IV fentanyl. Patient did go to imaging and imaging now resulted CT scan of the brain showing no acute intercranial abnormality. No evidence of any acute intracranial hemorrhage or mass or lesion. CT cervical spine also negative. X-ray left tibia-fibula showing a proximal tibia intra-articular fracture involving the lateral plateau. X-ray of the left knee also confirming the proximal tibia intra-articular fracture. And x-ray left femur showed no acute femoral fracture I did make patient as well as female in the room aware of results. Patient will be medicated with additional dose of IV fentanyl 50 mics and will obtain CT of the left knee due to the tibial plateau fracture as well as consulting orthopedics.   CT scan of the left knee resulted showing comminuted lateral tibial plateau fracture with displacement and depression. Orthopedic resident was consulted and in to see patient. Plan will be to admit patient and take him to the OR. Patient was made aware of all results and plan for admission as well as pending OR. Patient otherwise neurovascularly intact. Still with moderate discomfort will get relief from pain meds but then return of symptoms. Patient expressed full understanding for admission. Plan will be for admission for surgery. History from : Patient and Medical records     Limitations to history : None    Chronic Conditions: has a past medical history of Herpes genitalis in men. CONSULTS:Orthopedic MD,  Dr Naima Mendoza and Dr Fiona Burnett     Discussion with Other Profesionals : None    Social Determinants : None    Records Reviewed : Source patient and Inpatient Notes epic medical records        Disposition Considerations (Tests not ordered but considered, Shared Decision Making, Pt Expectation of Test or Tx.):   Appropriate for outpatient management yes and Evaluation by myself and discharge recommended. I am the Primary Clinician of Record. Counseling: The emergency provider has spoken with the patient and discussed todays results, in addition to providing specific details for the plan of care and counseling regarding the diagnosis and prognosis. Questions are answered at this time and they are agreeable with the plan.      --------------------------------- IMPRESSION AND DISPOSITION ---------------------------------    IMPRESSION  1. Closed fracture of left tibial plateau, initial encounter        DISPOSITION  Disposition: Admit to med/surg floor  Patient condition is good      NOTE: This report was transcribed using voice recognition software.  Every effort was made to ensure accuracy; however, inadvertent computerized transcription errors may be present      DAYSI Mclain - NIKO  02/06/23 0026

## 2023-02-06 NOTE — PROGRESS NOTES
Pt stating he wants to leave AMA, ortho resident notified, they will speak with Dr. Larry Kelly then come bedside to speak with pt. Side rails up x 4, bed in lowest position, pt reaching over rails and placing rails down attempting to get out of bed, explained to pt that for his safety after having preop nerve block and still unable to wiggle toes at this time. Pt states that he has crutches at home to get around.

## 2023-02-06 NOTE — ED NOTES
Procedural consent signed by patient, witnessed by this nurse and placed in soft chart.       Srinivasan Boone RN  02/06/23 1140

## 2023-02-06 NOTE — DISCHARGE INSTRUCTIONS
[facility]) Department of Orthopedic Surgery  [address] DO         MD Dr. Pooja Renee MD Julieta Hang, PA-C Vivianne Cuba PA-C Ladora Sofia PA-C      Orthopaedics Discharge Instructions   Weight bearing Status - Non-weight bearing - on left lower Extremity  Pain medication Per Prescriptions  Contact Office for Medication Refill- [phone]  Office can refill pain med every 7 days  If patient discharging to facility then pain control will be continued per facility physician  Ice to operative/injured site for 15-30 minutes of each hour for next 5 days    Recommend that you continue to ice the area 2-3 times per day after this   Elevate operative/injured limb on 2 pillows at home  Goal is to have limb above the heart if able  Continue DVT Prophylaxis (blood clot prevention) as Prescribed: aspirin   Wound care - ***  Fracture Care -  ***  Follow Up in Office in 2 weeks. Your first post op appointment is often with one of our PAs. Call the office at [phone] or directions or with any questions. Watch for these significant complications. Call physician if they or any other problems occur:  Fever over 101°, redness, swelling or warmth at the operative site  Unrelieved nausea    Foul smelling or cloudy drainage at the operative site   Unrelieved pain    Blood soaked dressing. (Some oozing may be normal)     Numb, pale, blue, cold or tingling extremity    No future appointments. It is the Department of Orthopaedic Trauma's standard of practice that providers will de-escalate(wean) all patients from narcotic(opioid) medications during the post-operative period. We provide multimodal pain control but opioid medications are tapered in all of our patients. If patient requires referral to pain management for prolonged taper off of opioid pain medication we will facilitate this process.

## 2023-02-06 NOTE — H&P
Department of Orthopedic Surgery  Resident Consult Note          Reason for Consult:  Left knee pain    HISTORY OF PRESENT ILLNESS:       Patient is a 40 y.o. male who presents with left knee pain after fall. Patient reports shortly prior to arrival he was chasing roosters in his backyard when he tripped and fell landing directly on the anterior aspect of his knee. Patient states that he heard an audible crack transported via girlfriend to the emergency department. Pain localized to the left knee radiating to the midshaft tibia. Anticoagulation - none. The patient is community Ambulator without assist  - wheelchair, cane, and walker. Pt lives at home. Patient has a significant history of genital herpes. Previous Orthopedic Surgeon - no Denies numbness/tingling/paresthesias. Denies any other orthopedic complaints at this time. Tobacco use: 3 to 4 cigarettes daily  Alcohol use: social drinker  Illicit drug use: frequent smoked marijuana    Past Medical History:        Diagnosis Date    Herpes genitalis in men      Past Surgical History:    No past surgical history on file. Current Medications:   Current Facility-Administered Medications: ceFAZolin (ANCEF) 2,000 mg in sterile water 20 mL IV syringe, 2,000 mg, IntraVENous, On Call to OR  methocarbamol (ROBAXIN) tablet 750 mg, 750 mg, Oral, 4x Daily  gabapentin (NEURONTIN) capsule 200 mg, 200 mg, Oral, TID  oxyCODONE (ROXICODONE) immediate release tablet 5 mg, 5 mg, Oral, Q4H PRN **OR** oxyCODONE HCl (OXY-IR) immediate release tablet 10 mg, 10 mg, Oral, Q4H PRN  Allergies:  Ciprofloxacin and Penicillins    Social History:   TOBACCO:   reports that he has quit smoking. His smoking use included cigarettes. He smoked an average of .5 packs per day. He has never used smokeless tobacco.  ETOH:   reports no history of alcohol use. DRUGS:   reports no history of drug use.   ACTIVITIES OF DAILY LIVING:    OCCUPATION:    Family History:   No family history on file.    REVIEW OF SYSTEMS:  CONSTITUTIONAL:  negative for  fevers, chills  EYES:  negative for blurred vision, visual disturbance  HEENT:  negative for  hearing loss, voice change  RESPIRATORY:  negative for  dyspnea, wheezing  CARDIOVASCULAR:  negative for  chest pain, palpitations  GASTROINTESTINAL:  negative for nausea, vomiting  GENITOURINARY:  negative for frequency, urinary incontinence  HEMATOLOGIC/LYMPHATIC:  negative for bleeding and petechiae  MUSCULOSKELETAL:  positive for  pain, joint swelling, decreased range of motion, and bone pain  NEUROLOGICAL:  negative for headaches, dizziness  BEHAVIOR/PSYCH:  negative for increased agitation and anxiety    PHYSICAL EXAM:    VITALS:  /72   Pulse 88   Temp 98 °F (36.7 °C)   Resp 14   Ht 6' (1.829 m)   Wt 185 lb (83.9 kg)   SpO2 99%   BMI 25.09 kg/m²   CONSTITUTIONAL:  awake, alert, cooperative, no apparent distress, and appears stated age  General appearance: alert, well appearing, and in no distress,  normal appearing weight  Mental status: alert, oriented to person, place, and time, normal mood, behavior, speech, dress, motor activity, and thought processes  Abdomen: soft, nondistended   Resp:   resp easy and unlabored, no audible wheezes note  Cardiac: distal pulses palpable, skin well perfused  Neurological: alert, oriented X3, normal speech, no focal findings or movement disorder noted, motor and sensory grossly normal bilaterally, normal muscle tone, no tremors, strength 5/5, normal gait and station  HEENT: normochephalic atraumatic, external ears and eyes normal, sclera normal, neck supple  Extremities:   peripheral pulses normal, no edema, redness or tenderness in the calves   Skin: normal coloration, no rashes or open wounds, no suspicious skin lesions noted  Psych: Affect euthymic   MUSCULOSKELETAL:  Left lower Extremity:  Lower extremities resting in a slightly knee flexed position goal of approximately 20 degrees.   Mild deformity noted at the knee. Significant joint effusion. Abrasions noted to the anterior aspect of the knee. Global tenderness to palpation about the knee. Denies tenderness to palpation about the hip, femur, midshaft tibia, ankle and foot. Limited range of motion of knee secondary to pain. Compartments soft and compressible, calf non-tender  +DP & PT pulses, Brisk Cap refill, Toes warm and perfused  Sensation grossly intact superficial/deep peroneal,saphenous,sural,tibial n. distributions  +GS/TA/EHL. Secondary Exam:   bilateralUE: No obvious signs of trauma. -TTP to fingers, hand, wrist, forearm, elbow, humerus, shoulder or clavicle. -- Patient able to flex/extend fingers, wrist, elbow and shoulder with active and passive ROM without pain, +2/4 Radial pulse, cap refill <3sec, +AIN/PIN/Radial/Ulnar/Median N, distal sensation grossly intact to C4-T1 dermatomes, compartments soft and compressible. rightLE: Abrasions noted anterior aspect of the knee. -TTP to foot, ankle, leg, knee, thigh, hip.-- Patient able to flex/extend toes, ankle, knee and hip with active and passive ROM without pain,+2/4 DP & PT pulses, cap refill <3sec, +5/5 PF/DF/EHL, distal sensation grossly intact to L4-S1 dermatomes, compartments soft and compressible. Pelvis: -TTP, -Log roll, -Heel strike     DATA:    CBC: No results found for: WBC, RBC, HGB, HCT, MCV, MCH, MCHC, RDW, PLT, MPV  PT/INR:    Lab Results   Component Value Date/Time    PROTIME 14.1 02/06/2023 12:25 AM    INR 1.3 02/06/2023 12:25 AM     CRP/ESR: No results found for: CRP, SEDRATE  Lactic Acid : No results found for: LACTA    Radiology Review:  02/06/23 - XR left knee/tibia/fibula demonstrating split depressed tibial plateau fracture at the lateral compartment. Significant depression appreciated. Lateral radiographic views fracture line extending posterior superior to anterior inferior. It does not appear to be involvement of the medial column.   There is appears to be a benign or possibly chronic osseous fragment just proximal to the tibial tubercle. X-ray left hip and femur demonstrates no acute fracture dislocation proximally. IMPRESSION:   Left Schatzker Type 2 Tibial plateau Fracture. PLAN:  NWB - LLE  After informed consent was verbally obtained,Closed reduction was then performed with application of well padded long posterior slab splint with knee immobilizer. Patient tolerated well and remained neurovascularly intact pre and post reduction  Diet NPO now  Treatment consent  Preoperative antibiotics ordered  Pain Control with per admitting service  Continue ice and elevation to decrease swelling  DVT prophylaxis: Hold preoperatively however pneumatic compression device can be applied to the contralateral extremity  Plan for open reduction internal fixation left tibial plateau versus left knee application of spanning external fixator  Discussed with Dr. Osmel Sequeira Attending    I have seen and evaluated the patient and agree with the above assessment. I have performed the key components of the history and physical examination and concur completely with the findings as documented. CC: Left knee pain    HPI: This is a 40 y.o. male who presents with left knee pain after fall. Patient reports shortly prior to arrival he was chasing roosters in his backyard when he tripped and fell landing directly on the anterior aspect of his knee. Patient states that he heard an audible crack transported via girlfriend to the emergency department. Pain localized to the left knee radiating to the midshaft tibia. Anticoagulation - none. The patient is community Ambulator without assist  - wheelchair, cane, and walker. Pt lives at home. Patient has a significant history of genital herpes. Previous Orthopedic Surgeon - no Denies numbness/tingling/paresthesias. Denies any other orthopedic complaints at this time.   Tobacco use: 3 to 4 cigarettes daily  Alcohol use: social drinker  Illicit drug use: frequent smoked marijuana    ROS, medications, allergies, past medical/surgical/social/family histories reviewed and as above    PE:  /72   Pulse 88   Temp 98 °F (36.7 °C)   Resp 14   Ht 6' (1.829 m)   Wt 185 lb (83.9 kg)   SpO2 99%   BMI 25.09 kg/m²   As above    Radiographic Review:  Left lateral tibial plateau fracture, split depression, comminution through the articular surface of the anterior central portion of the lateral plateau, widening of the split, underlying tricompartmental degenerative arthritis    ASSESSMENT:  Left lateral tibial plateau fracture    PLAN:  Patient placed into knee immobilizer, maintain bedrest nonweightbearing affected extremity  Admit to orthopedics, pain control, n.p.o. Had lengthy discussion with patient regarding their diagnosis, typical prognosis, and expected outcomes. We reviewed the possible complications from the injury itself despite treatment chosen. We also discussed treatment options including nonoperative managements versus surgical management, along with risks and benefits of each. Patient has elected for surgical management despite associated risks. OR 2/6/2023 left lateral tibial plateau fracture ORIF, possible bone allograft, soft tissue repairs as indicated  I have explained the risks and complications of the recommended surgery with the patient at length, as well as discussed potential treatment alternatives including nonoperative management.  These risks include but are not limited to death or complication from anesthesia, continued pain, nerve tendon or vascular injury, infection, nonunion or malunion, symptomatic hardware or hardware failure, deep vein thrombosis or pulmonary embolism, stiffness/arthrofibrosis, unforeseen complications, and need for further surgery, etc.  Patient understood this, asked appropriate questions, which were all answered, and he has elected to proceed with the procedure. Electronically signed by   Jacob Hendricks DO    NOTE: This report was transcribed using voice recognition software.  Every effort was made to ensure accuracy; however, inadvertent computerized transcription errors may be present

## 2023-02-06 NOTE — ED NOTES
Department of Emergency Medicine  FIRST PROVIDER TRIAGE NOTE             Independent MLP           2/5/23  8:58 PM EST    Date of Encounter: 2/5/23   MRN: [id number]      HPI: Sharon Jean is a 40 y.o. male who presents to the ED for Leg Injury (Fall from ground level, Hit head -LOC)       ROS: Negative for cp or sob. PE: Gen Appearance/Constitutional: alert  Musculoskeletal: moves all extremities x 3     Initial Plan of Care: All treatment areas with department are currently occupied. Plan to order/Initiate the following while awaiting opening in ED: imaging studi.   Initiate Treatment-Testing, Proceed toTreatment Area When Bed Available for ED Attending/MLP to Continue Care    Electronically signed by DAYSI Ma CNP   DD: 2/5/23       DAYSI Ma CNP  02/05/23 0275

## 2023-02-06 NOTE — PROGRESS NOTES
Consult faxed to [facility], verified with Teo Velasquez who was also notified of pt awaiting in pacu for bed assignment

## 2023-02-06 NOTE — ANESTHESIA PROCEDURE NOTES
Peripheral Block    Patient location during procedure: pre-op  Reason for block: post-op pain management and at surgeon's request  Start time: 2/6/2023 12:26 PM  End time: 2/6/2023 12:28 PM  Staffing  Performed: anesthesiologist   Anesthesiologist: Quin Kirkland DO  Preanesthetic Checklist  Completed: patient identified, IV checked, site marked, risks and benefits discussed, surgical/procedural consents, equipment checked, pre-op evaluation, timeout performed, anesthesia consent given, oxygen available and monitors applied/VS acknowledged  Peripheral Block   Patient position: supine  Prep: ChloraPrep  Provider prep: mask and sterile gloves  Patient monitoring: cardiac monitor, continuous pulse ox, frequent blood pressure checks and IV access  Block type: Sciatic  Popliteal  Laterality: left  Injection technique: single-shot  Guidance: ultrasound guided  Local infiltration: lidocaine  Infiltration strength: 1 %  Local infiltration: lidocaine  Dose: 3 mL    Needle   Needle gauge: 21 G  Needle localization: ultrasound guidance  Needle length: 10 cm  Assessment   Injection assessment: negative aspiration for heme, no paresthesia on injection and local visualized surrounding nerve on ultrasound  Paresthesia pain: none  Slow fractionated injection: yes  Hemodynamics: stable  Real-time US image taken/store: yes  Outcomes: uncomplicated and patient tolerated procedure well    Additional Notes  U/S [id number]. Time out performed.          Medications Administered  ropivacaine (NAROPIN) injection 0.5% - Perineural   30 mL - 2/6/2023 12:26:00 PM  dexamethasone 4 MG/ML - Perineural   4 mg - 2/6/2023 12:26:00 PM

## 2023-02-06 NOTE — CONSULTS
Department of Orthopedic Surgery  Resident Consult Note          Reason for Consult:  Left knee pain    HISTORY OF PRESENT ILLNESS:       Patient is a 40 y.o. male who presents with left knee pain after fall. Patient reports shortly prior to arrival he was chasing roosters in his backyard when he tripped and fell landing directly on the anterior aspect of his knee. Patient states that he heard an audible crack transported via girlfriend to the emergency department. Pain localized to the left knee radiating to the midshaft tibia. Anticoagulation - none. The patient is community Ambulator without assist  - wheelchair, cane, and walker. Pt lives at home. Patient has a significant history of genital herpes. Previous Orthopedic Surgeon - no Denies numbness/tingling/paresthesias. Denies any other orthopedic complaints at this time. Tobacco use: 3 to 4 cigarettes daily  Alcohol use: social drinker  Illicit drug use: frequent smoked marijuana    Past Medical History:        Diagnosis Date    Herpes genitalis in men      Past Surgical History:    No past surgical history on file. Current Medications:   No current facility-administered medications for this encounter. Allergies:  Ciprofloxacin and Penicillins    Social History:   TOBACCO:   reports that he has quit smoking. His smoking use included cigarettes. He smoked an average of .5 packs per day. He has never used smokeless tobacco.  ETOH:   reports no history of alcohol use. DRUGS:   reports no history of drug use. ACTIVITIES OF DAILY LIVING:    OCCUPATION:    Family History:   No family history on file.     REVIEW OF SYSTEMS:  CONSTITUTIONAL:  negative for  fevers, chills  EYES:  negative for blurred vision, visual disturbance  HEENT:  negative for  hearing loss, voice change  RESPIRATORY:  negative for  dyspnea, wheezing  CARDIOVASCULAR:  negative for  chest pain, palpitations  GASTROINTESTINAL:  negative for nausea, vomiting  GENITOURINARY:  negative for frequency, urinary incontinence  HEMATOLOGIC/LYMPHATIC:  negative for bleeding and petechiae  MUSCULOSKELETAL:  positive for  pain, joint swelling, decreased range of motion, and bone pain  NEUROLOGICAL:  negative for headaches, dizziness  BEHAVIOR/PSYCH:  negative for increased agitation and anxiety    PHYSICAL EXAM:    VITALS:  BP (!) 155/88   Pulse 90   Temp 98 °F (36.7 °C)   Resp 19   Ht 6' (1.829 m)   Wt 185 lb (83.9 kg)   SpO2 98%   BMI 25.09 kg/m²   CONSTITUTIONAL:  awake, alert, cooperative, no apparent distress, and appears stated age  General appearance: alert, well appearing, and in no distress,  normal appearing weight  Mental status: alert, oriented to person, place, and time, normal mood, behavior, speech, dress, motor activity, and thought processes  Abdomen: soft, nondistended   Resp:   resp easy and unlabored, no audible wheezes note  Cardiac: distal pulses palpable, skin well perfused  Neurological: alert, oriented X3, normal speech, no focal findings or movement disorder noted, motor and sensory grossly normal bilaterally, normal muscle tone, no tremors, strength 5/5, normal gait and station  HEENT: normochephalic atraumatic, external ears and eyes normal, sclera normal, neck supple  Extremities:   peripheral pulses normal, no edema, redness or tenderness in the calves   Skin: normal coloration, no rashes or open wounds, no suspicious skin lesions noted  Psych: Affect euthymic   MUSCULOSKELETAL:  Left lower Extremity:  Lower extremities resting in a slightly knee flexed position goal of approximately 20 degrees. Mild deformity noted at the knee. Significant joint effusion. Abrasions noted to the anterior aspect of the knee. Global tenderness to palpation about the knee. Denies tenderness to palpation about the hip, femur, midshaft tibia, ankle and foot. Limited range of motion of knee secondary to pain.   Compartments soft and compressible, calf non-tender  +DP & PT pulses, Brisk Cap refill, Toes warm and perfused  Sensation grossly intact superficial/deep peroneal,saphenous,sural,tibial n. distributions  +GS/TA/EHL. Secondary Exam:   bilateralUE: No obvious signs of trauma. -TTP to fingers, hand, wrist, forearm, elbow, humerus, shoulder or clavicle. -- Patient able to flex/extend fingers, wrist, elbow and shoulder with active and passive ROM without pain, +2/4 Radial pulse, cap refill <3sec, +AIN/PIN/Radial/Ulnar/Median N, distal sensation grossly intact to C4-T1 dermatomes, compartments soft and compressible. rightLE: Abrasions noted anterior aspect of the knee. -TTP to foot, ankle, leg, knee, thigh, hip.-- Patient able to flex/extend toes, ankle, knee and hip with active and passive ROM without pain,+2/4 DP & PT pulses, cap refill <3sec, +5/5 PF/DF/EHL, distal sensation grossly intact to L4-S1 dermatomes, compartments soft and compressible. Pelvis: -TTP, -Log roll, -Heel strike     DATA:    CBC: No results found for: WBC, RBC, HGB, HCT, MCV, MCH, MCHC, RDW, PLT, MPV  PT/INR:  No results found for: PROTIME, INR  CRP/ESR: No results found for: CRP, SEDRATE  Lactic Acid : No results found for: LACTA    Radiology Review:  02/05/23 - XR left knee/tibia/fibula demonstrating split depressed tibial plateau fracture at the lateral compartment. Significant depression appreciated. Lateral radiographic views fracture line extending posterior superior to anterior inferior. It does not appear to be involvement of the medial column. There is appears to be a benign or possibly chronic osseous fragment just proximal to the tibial tubercle. X-ray left hip and femur demonstrates no acute fracture dislocation proximally. IMPRESSION:   Left Schatzker Type 2 Tibial plateau Fracture. PLAN:  NWDIEGO - MEÑO  After informed consent was verbally obtained,Closed reduction was then performed with application of well padded long posterior slab splint with knee immobilizer.   Patient tolerated well and remained neurovascularly intact pre and post reduction  Diet NPO now  Treatment consent  Preoperative antibiotics ordered  Pain Control with per admitting service  Continue ice and elevation to decrease swelling  DVT prophylaxis: Hold preoperatively however pneumatic compression device can be applied to the contralateral extremity  Plan for open reduction internal fixation left tibial plateau versus left knee application of spanning external fixator  Discussed with Dr. Maira Snider Attending    I have seen and evaluated the patient and agree with the above assessment. I have performed the key components of the history and physical examination and concur completely with the findings as documented. CC: Left knee pain    HPI: This is a 40 y.o. male who presents with left knee pain after fall. Patient reports shortly prior to arrival he was chasing roosters in his backyard when he tripped and fell landing directly on the anterior aspect of his knee. Patient states that he heard an audible crack transported via girlfriend to the emergency department. Pain localized to the left knee radiating to the midshaft tibia. Anticoagulation - none. The patient is community Ambulator without assist  - wheelchair, cane, and walker. Pt lives at home. Patient has a significant history of genital herpes. Previous Orthopedic Surgeon - no Denies numbness/tingling/paresthesias. Denies any other orthopedic complaints at this time.   Tobacco use: 3 to 4 cigarettes daily  Alcohol use: social drinker  Illicit drug use: frequent smoked marijuana    ROS, medications, allergies, past medical/surgical/social/family histories reviewed and as above    PE:  /72   Pulse 88   Temp 98 °F (36.7 °C)   Resp 14   Ht 6' (1.829 m)   Wt 185 lb (83.9 kg)   SpO2 99%   BMI 25.09 kg/m²   As above    Radiographic Review:  Left lateral tibial plateau fracture, split depression, comminution through the articular surface of the anterior central portion of the lateral plateau, widening of the split, underlying tricompartmental degenerative arthritis    ASSESSMENT:  Left lateral tibial plateau fracture    PLAN:  Patient placed into knee immobilizer, maintain bedrest nonweightbearing affected extremity  Admit to orthopedics, pain control, n.p.o. Had lengthy discussion with patient regarding their diagnosis, typical prognosis, and expected outcomes. We reviewed the possible complications from the injury itself despite treatment chosen. We also discussed treatment options including nonoperative managements versus surgical management, along with risks and benefits of each. Patient has elected for surgical management despite associated risks. OR 2/6/2023 left lateral tibial plateau fracture ORIF, possible bone allograft, soft tissue repairs as indicated  I have explained the risks and complications of the recommended surgery with the patient at length, as well as discussed potential treatment alternatives including nonoperative management. These risks include but are not limited to death or complication from anesthesia, continued pain, nerve tendon or vascular injury, infection, nonunion or malunion, symptomatic hardware or hardware failure, deep vein thrombosis or pulmonary embolism, stiffness/arthrofibrosis, unforeseen complications, and need for further surgery, etc.  Patient understood this, asked appropriate questions, which were all answered, and he has elected to proceed with the procedure. Electronically signed by   Gregg Dixon DO    NOTE: This report was transcribed using voice recognition software.  Every effort was made to ensure accuracy; however, inadvertent computerized transcription errors may be present

## 2023-02-06 NOTE — ANESTHESIA PRE PROCEDURE
Department of Anesthesiology  Preprocedure Note       Name:  Logan Ferrer   Age:  40 y.o.  :  1985                                          MRN:  [id number]         Date:  2023      Surgeon: Sam Cassidy):  Trudy Ramos DO    Procedure: Procedure(s):  LEFT TIBIAL PLATEAU OPEN REDUCTION INTERNAL FIXATION -VS- EX-FIX ((WANTS TO FOLLOW OTHER PLATEAU FRACTURE))    Medications prior to admission:   Prior to Admission medications    Not on File       Current medications:    Current Facility-Administered Medications   Medication Dose Route Frequency Provider Last Rate Last Admin    ceFAZolin (ANCEF) 2,000 mg in sterile water 20 mL IV syringe  2,000 mg IntraVENous On Call to [address] [place] [address], DO        midazolam PF (VERSED) injection 1 mg  1 mg IntraVENous PRN Chaitanya Saez, DO   2 mg at 23 1215    methocarbamol (ROBAXIN) tablet 750 mg  750 mg Oral 4x Daily Adamanabela Amador, DO   750 mg at 23 0046    gabapentin (NEURONTIN) capsule 200 mg  200 mg Oral TID Elonda [place], DO   200 mg at 23 1126    oxyCODONE (ROXICODONE) immediate release tablet 5 mg  5 mg Oral Q4H PRN Elonda [place], DO        Or    oxyCODONE HCl (OXY-IR) immediate release tablet 10 mg  10 mg Oral Q4H PRN Elonda [place], DO   10 mg at 23 1126       Allergies: Allergies   Allergen Reactions    Ciprofloxacin     Penicillins        Problem List:    Patient Active Problem List   Diagnosis Code    Tibial plateau fracture, left, closed, initial encounter S82.142A       Past Medical History:        Diagnosis Date    Herpes genitalis in men        Past Surgical History:  No past surgical history on file.     Social History:    Social History     Tobacco Use    Smoking status: Former     Packs/day: 0.50     Types: Cigarettes    Smokeless tobacco: Never   Substance Use Topics    Alcohol use: No     Comment: occsa                                Counseling given: Not Answered      Vital Signs (Current):   Vitals:    23 0237 02/06/23 0720 02/06/23 1132 02/06/23 1215   BP: (!) 139/104 120/72 (!) 173/91 (!) 170/84   Pulse:  88 86 82   Resp:  14 18 18   Temp:   97.8 °F (36.6 °C)    TempSrc:   Oral    SpO2: 99% 99% 96% 98%   Weight:       Height:                                                  BP Readings from Last 3 Encounters:   02/06/23 (!) 170/84   06/16/19 (!) 139/100   01/18/19 (!) 156/77       NPO Status:  > 8hrs                                                                               BMI:   Wt Readings from Last 3 Encounters:   02/05/23 185 lb (83.9 kg)   06/16/19 180 lb (81.6 kg)   01/18/19 180 lb (81.6 kg)     Body mass index is 25.09 kg/m².    CBC: No results found for: WBC, RBC, HGB, HCT, MCV, RDW, PLT    CMP: No results found for: NA, K, CL, CO2, BUN, CREATININE, GFRAA, AGRATIO, LABGLOM, GLUCOSE, GLU, PROT, CALCIUM, BILITOT, ALKPHOS, AST, ALT    POC Tests: No results for input(s): POCGLU, POCNA, POCK, POCCL, POCBUN, POCHEMO, POCHCT in the last 72 hours.    Coags:   Lab Results   Component Value Date/Time    PROTIME 14.1 02/06/2023 12:25 AM    INR 1.3 02/06/2023 12:25 AM    APTT 32.0 02/06/2023 12:25 AM       HCG (If Applicable): No results found for: PREGTESTUR, PREGSERUM, HCG, HCGQUANT     ABGs: No results found for: PHART, PO2ART, YHL0ICP, ONG6UAY, BEART, X2SKLFSG     Type & Screen (If Applicable):  No results found for: LABABO, LABRH    Drug/Infectious Status (If Applicable):  No results found for: HIV, HEPCAB    COVID-19 Screening (If Applicable): No results found for: COVID19        Anesthesia Evaluation  Patient summary reviewed and Nursing notes reviewed  Airway: Mallampati: II  TM distance: >3 FB   Neck ROM: full  Mouth opening: > = 3 FB   Dental:      Comment: None loose    Pulmonary:Negative Pulmonary ROS   (+) decreased breath sounds                            Cardiovascular:  Exercise tolerance: good (>4 METS),           Rhythm: regular  Rate: normal                    Neuro/Psych:   Negative Neuro/Psych  ROS              GI/Hepatic/Renal: Neg GI/Hepatic/Renal ROS            Endo/Other: Negative Endo/Other ROS                    Abdominal:             Vascular: negative vascular ROS. Other Findings:           Anesthesia Plan      general and regional     ASA 1       Induction: intravenous. MIPS: Postoperative opioids intended, Prophylactic antiemetics administered and Postoperative trial extubation. Anesthetic plan and risks discussed with patient. Plan discussed with CRNA.                     Linus Shane,    2/6/2023

## 2023-02-06 NOTE — ANESTHESIA PROCEDURE NOTES
Peripheral Block    Patient location during procedure: pre-op  Reason for block: post-op pain management and at surgeon's request  Start time: 2/6/2023 12:19 PM  End time: 2/6/2023 12:21 PM  Staffing  Performed: anesthesiologist   Anesthesiologist: Antwan England DO  Preanesthetic Checklist  Completed: patient identified, IV checked, site marked, risks and benefits discussed, surgical/procedural consents, equipment checked, pre-op evaluation, timeout performed, anesthesia consent given, oxygen available and monitors applied/VS acknowledged  Peripheral Block   Patient position: supine  Prep: alcohol swabs  Provider prep: mask and sterile gloves  Patient monitoring: cardiac monitor, continuous pulse ox, frequent blood pressure checks and IV access  Block type: Femoral  Laterality: left  Injection technique: single-shot  Guidance: ultrasound guided  Local infiltration: ropivacaine  Local infiltration: ropivacaine    Needle   Needle type: combined needle/nerve stimulator   Needle gauge: 22 G  Needle localization: ultrasound guidance  Needle length: 5 cm  Assessment   Injection assessment: negative aspiration for heme, no paresthesia on injection and local visualized surrounding nerve on ultrasound  Paresthesia pain: none  Slow fractionated injection: yes  Hemodynamics: stable  Real-time US image taken/store: yes  Outcomes: uncomplicated and patient tolerated procedure well    Additional Notes  Timeout performed  Medications Administered  dexamethasone 4 MG/ML - Perineural   4 mg - 2/6/2023 12:19:00 PM  ropivacaine (NAROPIN) injection 0.5% - Perineural   30 mL - 2/6/2023 12:19:00 PM

## 2023-02-06 NOTE — OP NOTE
Operative Note      Patient: Regla Mcknight  YOB: 1985  MRN: [id number]    Date of Procedure: 2/6/2023    Pre-Op Diagnosis:   Left lateral tibial plateau fracture    Post-Op Diagnosis:   Left lateral tibial plateau fracture, split depression  Left lateral meniscal tear bucket-handle       Procedure(s):  Left lateral tibial plateau fracture open reduction with internal fixation  Left knee lateral arthrotomy with open lateral meniscus repair    Surgeon(s):  Martinez Long DO    Assistant:   Resident: Adryan Junior DO    Anesthesia: General    Estimated Blood Loss (mL): less than 595     Complications: None    Specimens:   * No specimens in log *    Implants:  Implant Name Type Inv.  Item Serial No.  Lot No. LRB No. Used Action   [id number] - [id number]   [id number]   Left 1 Implanted   SCREW CORTX SLFTP FTHRD 3.5X44MM - [id number]  SCREW CORTX SLFTP FTHRD 3.5X44MM  [employer] [place]-  Left 1 Implanted   SCREW CORTX SLFTP FTHRD 3.5X46MM - [id number]  SCREW CORTX SLFTP FTHRD 3.5X46MM  [employer] [place]-  Left 1 Implanted   SCREW CORTX SLFTP FTHRD 3.5X42MM - [id number]  SCREW CORTX SLFTP FTHRD 3.5X42MM  [employer] [place]-  Left 1 Implanted   PLATE BNE [id number] 6 H ST L PROX BILAT TIB S STL NEUT AURELIO - [id number]  PLATE BNE [id number] 6 H ST L PROX BILAT TIB S STL NEUT AURELIO  [employer] [place]-  Left 1 Implanted   SCREW LK SLFTP W/ 3.5X80MM - [id number]  SCREW LK SLFTP W/ 3.5X80MM  [employer] [place]-  Left 3 Implanted   SCREW CORTX SLFTP FTHRD 3.5X48MM - [id number]  SCREW CORTX SLFTP FTHRD 3.5X48MM  [employer] [place]-  Left 1 Implanted   SCREW BNE L60MM DIA3.5MM TIM S STL ST AURELIO FULL THRD - [id number]  SCREW BNE L60MM DIA3.5MM TIM S STL ST AURELIO FULL THRD  [employer] [place]-  Left 1 Implanted   SCREW BNE L75MM DIA3.5MM TIM S STL ST AURELIO FULL THRD T15 - [id number]  SCREW BNE L75MM DIA3.5MM TIM S STL ST AURELIO FULL THRD T15  [facility] [employer]TH[employer] [place]-WD  Left 2 Implanted         Drains: * No LDAs found *    Findings: Split depressed lateral plateau fracture, pre-existing tricompartmental osteoarthritis with peripheral osteophyte formation about the lateral plateau rim. Chondral delamination with severe comminution to the centrally impacted portion of the lateral plateau. Complete bucket-handle tear of the lateral meniscus from the anterior body back to the posterior body, horns remained intact anteriorly and posteriorly. Detailed Description of Procedure:   Patient brought to the operating suite where he was placed on upper table supine position. Received a general anesthetic by department verenice well 2 g of Ancef intravenously. Left lower extremity sterilely prepped and draped in standard sterile fashion. Surgical timeout was performed per protocol by member surgical team.  Leg was elevated exsanguinated with an Esmarch tourniquet set at 250 mmHg pressure. Curved incision made of the proximal lateral tibia curving over Khushboo's tubercle and just lateral to the tibial crest.  Skin was incised with a scalpel electrocautery taken to subtends tissues. IT band divided longitudinally and this fibers over the lateral aspect of the knee joint curving over Khushboo's tubercle lateral to the tibial crest.  Tibialis anterior was elevated with electrocautery and subperiosteal ovation. The split was seen extending down through the metaphyseal region. We now performed a formal lateral arthrotomy using a scalpel. Large hemarthrosis was suctioned and irrigated. The leg was held in a varus stress the impaction comminution was evident as well as the large bucket-handle tear of the lateral meniscus which was incarcerated the main fracture line. This was freed from the joint surface with a Milli Parisian this was not openly repaired using #2 FiberWire running horizontal mattress fashion sutures anterior to posterior. Next we open the lateral wall fragment.   There are some impaction on this which was disimpacted and provisionally pinned to the lateral wall fragment. There was significant mount of central impaction to the main fracture line on the anterior two thirds of the lateral plateau. Using a Wittmann and osteotome we now demarcated the main fracture lines care was taken to maintain a subchondral column of bone we disimpacted 3 separate pieces. There are some chondral delamination at the main fracture line, these free-floating fragments of cartilage were removed. Patient had obvious pre-existing osteoarthritis with peripheral rim osteophyte formation. We disimpacted the more medial fragments and used the intact cartilage as a read for reduction. We now backfilled this with cortical cancellous allograft chips. The large lateral wall fragments were then rotated back into position and provisionally pinned in position. Fluoroscopy then utilized multiple views confirming appropriate duction. A plate of appropriate length was then pinned in position of the proximal lateral tibia under fluoroscopic guidance was compressed with bicortical screw fixation distal to the fracture. Articular clamp was now placed proximally making small stab incision medially. Once we had good articular compression lag screw was placed to the proximal row followed by locking screws. 2 additional screws then placed in the intact shaft distally. This point time he felt the next reduction stabilization final images taken sent to step system. Wounds were thoroughly irrigated. The previous horizontal mattress sutures repairing the meniscus repair were then passed through the fascia distally respective of their position. Fascia layers were closed with Vicryl suture subcutaneous tissue Monocryl skin with nylon in simple operative fashion. Wound was then dressed antibiotic ointment Xeroform 4 x 4 fluffs web roll and Ace bandage. Tourniquet was let down at this juncture. Patient is now placed in a knee immobilizer.   Was extubated uneventfully transfer onto the [facility] into the postanesthesia care in stable condition. Postoperative plans:  Patient will be admitted back to the medical surgical [facility]. He received postop antibiotics and be started chemical DVT prophylaxis postoperative day 1. He is to maintain his knee immobilizer until we transition him to hinged ROM brace set at 0 to 30 degrees. He will be strict nonweightbearing affected extremity. Once discharged from the hospital he will follow-up in orthopedic office in 2 weeks for repeat evaluation. Electronically signed by Geovanna Sandoval DO on 2/6/2023     NOTE: This report was transcribed using voice recognition software.  Every effort was made to ensure accuracy; however, inadvertent computerized transcription errors may be present

## 2023-02-06 NOTE — PROGRESS NOTES
CLINICAL PHARMACY NOTE: MEDS TO BEDS    Total # of Prescriptions Filled: 2   The following medications were delivered to the patient:  PERCOCET 5/325 MG   ASPIRIN 325 MG     Additional Documentation:   PICKED UP @ PHARMACY @ 2:24PM

## 2023-02-06 NOTE — BRIEF OP NOTE
Brief Postoperative Note      Patient: Leilani Howard  YOB: 1985  MRN: [id number]    Date of Procedure: 2/6/2023    Pre-Op Diagnosis: Left lateral tibial plateau fracture    Post-Op Diagnosis: Same       Procedure(s):  LEFT TIBIAL PLATEAU OPEN REDUCTION INTERNAL FIXATION -VS- EX-FIX    Surgeon(s):  Kamari Melo DO    Assistant:  Resident: Gerda Ayon DO    Anesthesia: General    Estimated Blood Loss (mL): less than 50     Complications: None    Specimens:   * No specimens in log *    Implants:  Implant Name Type Inv.  Item Serial No.  Lot No. LRB No. Used Action   [id number] - [id number]   [id number]   Left 1 Implanted   SCREW CORTX SLFTP FTHRD 3.5X44MM - [id number]  SCREW CORTX SLFTP FTHRD 3.5X44MM  [employer] [place]-  Left 1 Implanted   SCREW CORTX SLFTP FTHRD 3.5X46MM - [id number]  SCREW CORTX SLFTP FTHRD 3.5X46MM  [employer] [place]-  Left 1 Implanted   SCREW CORTX SLFTP FTHRD 3.5X42MM - [id number]  SCREW CORTX SLFTP FTHRD 3.5X42MM  [employer] [place]-  Left 1 Implanted   PLATE BNE [id number] 6 H ST L PROX BILAT TIB S STL NEUT AURELIO - [id number]  PLATE BNE [id number] 6 H ST L PROX BILAT TIB S STL NEUT AURELIO  [employer] [place]-  Left 1 Implanted   SCREW LK SLFTP W/ 3.5X80MM - [id number]  SCREW LK SLFTP W/ 3.5X80MM  [employer] [place]-  Left 3 Implanted   SCREW CORTX SLFTP FTHRD 3.5X48MM - [id number]  SCREW CORTX SLFTP FTHRD 3.5X48MM  [employer] [place]-  Left 1 Implanted   SCREW BNE L60MM DIA3.5MM TIM S STL ST AURELIO FULL THRD - [id number]  SCREW BNE L60MM DIA3.5MM TIM S STL ST AURELIO FULL THRD  [employer] [place]-WD  Left 1 Implanted   SCREW BNE L75MM DIA3.5MM TIM S STL ST AURELIO FULL THRD T15 - [id number]  SCREW BNE L75MM DIA3.5MM TIM S STL ST AURELIO FULL THRD T15  [employer] [place]-WD  Left 2 Implanted         Drains: * No LDAs found *    Findings: see op note    Electronically signed by Justus Conde DO on 2/6/2023 at 2:40 PM

## 2023-02-07 NOTE — ANESTHESIA POSTPROCEDURE EVALUATION
Department of Anesthesiology  Postprocedure Note    Patient: Samuel Castellon  MRN: [id number]  YOB: 1985  Date of evaluation: 2/7/2023      Procedure Summary     Date: 02/06/23 Room / Location: [facility] OR 08 / [facility]    Anesthesia Start: 1242 Anesthesia Stop: 1445    Procedure: LEFT TIBIAL PLATEAU OPEN REDUCTION INTERNAL FIXATION -VS- EX-FIX (Left) Diagnosis:       Tibial plateau fracture, left, closed, initial encounter      (tibial plateau fracture)    Surgeons: Perla Saldana DO Responsible Provider: Dalila Dee DO    Anesthesia Type: General ASA Status: 1          Anesthesia Type: General    Rigoberto Phase I: Rigoberto Score: 10    Rigoberto Phase II:        Anesthesia Post Evaluation    Patient location during evaluation: PACU  Patient participation: complete - patient participated  Level of consciousness: awake and alert  Airway patency: patent  Nausea & Vomiting: no nausea and no vomiting  Complications: no  Cardiovascular status: blood pressure returned to baseline  Respiratory status: acceptable  Hydration status: euvolemic  Multimodal analgesia pain management approach

## 2023-02-13 ENCOUNTER — HOSPITAL ENCOUNTER (OUTPATIENT)
Dept: GENERAL RADIOLOGY | Age: 38
Discharge: HOME | End: 2023-02-15
Payer: MEDICAID

## 2023-02-13 ENCOUNTER — OFFICE VISIT (OUTPATIENT)
Dept: ORTHOPEDIC SURGERY | Age: 38
End: 2023-02-13
Payer: MEDICAID

## 2023-02-13 ENCOUNTER — TELEPHONE (OUTPATIENT)
Dept: ORTHOPEDIC SURGERY | Age: 38
End: 2023-02-13

## 2023-02-13 ENCOUNTER — HOSPITAL ENCOUNTER (OUTPATIENT)
Dept: ULTRASOUND IMAGING | Age: 38
Discharge: HOME | End: 2023-02-15
Payer: MEDICAID

## 2023-02-13 DIAGNOSIS — M79.662 PAIN OF LEFT CALF: Primary | ICD-10-CM

## 2023-02-13 DIAGNOSIS — R52 PAIN: ICD-10-CM

## 2023-02-13 DIAGNOSIS — S82.142A TIBIAL PLATEAU FRACTURE, LEFT, CLOSED, INITIAL ENCOUNTER: ICD-10-CM

## 2023-02-13 DIAGNOSIS — M79.662 PAIN OF LEFT CALF: ICD-10-CM

## 2023-02-13 PROCEDURE — 99213 OFFICE O/P EST LOW 20 MIN: CPT

## 2023-02-13 PROCEDURE — 93971 EXTREMITY STUDY: CPT

## 2023-02-13 PROCEDURE — 73590 X-RAY EXAM OF LOWER LEG: CPT

## 2023-02-13 PROCEDURE — 99024 POSTOP FOLLOW-UP VISIT: CPT | Performed by: PHYSICIAN ASSISTANT

## 2023-02-13 PROCEDURE — 73560 X-RAY EXAM OF KNEE 1 OR 2: CPT

## 2023-02-13 RX ORDER — OXYCODONE HYDROCHLORIDE AND ACETAMINOPHEN 5; 325 MG/1; MG/1
TABLET ORAL
COMMUNITY
Start: 2023-02-06 | End: 2023-02-13 | Stop reason: SDUPTHER

## 2023-02-13 RX ORDER — METHOCARBAMOL 750 MG/1
750 TABLET, FILM COATED ORAL 3 TIMES DAILY
Qty: 90 TABLET | Refills: 0 | Status: SHIPPED | OUTPATIENT
Start: 2023-02-13 | End: 2023-03-15

## 2023-02-13 RX ORDER — OXYCODONE HYDROCHLORIDE AND ACETAMINOPHEN 5; 325 MG/1; MG/1
1 TABLET ORAL EVERY 6 HOURS PRN
Qty: 28 TABLET | Refills: 0 | Status: SHIPPED | OUTPATIENT
Start: 2023-02-13 | End: 2023-02-20

## 2023-02-13 RX ORDER — IBUPROFEN 200 MG
200 TABLET ORAL EVERY 6 HOURS PRN
COMMUNITY
End: 2023-02-13 | Stop reason: ALTCHOICE

## 2023-02-13 RX ORDER — ACETAMINOPHEN 500 MG
1000 TABLET ORAL 2 TIMES DAILY PRN
Qty: 120 TABLET | Refills: 1 | Status: SHIPPED | OUTPATIENT
Start: 2023-02-13

## 2023-02-13 RX ORDER — IBUPROFEN 800 MG/1
800 TABLET ORAL EVERY 8 HOURS PRN
Qty: 90 TABLET | Refills: 1 | Status: SHIPPED | OUTPATIENT
Start: 2023-02-13

## 2023-02-13 RX ORDER — GABAPENTIN 100 MG/1
100 CAPSULE ORAL 3 TIMES DAILY
Qty: 30 CAPSULE | Refills: 0 | Status: SHIPPED | OUTPATIENT
Start: 2023-02-13 | End: 2023-02-23

## 2023-02-13 NOTE — PATIENT INSTRUCTIONS
Future Appointments   Date Time Provider Adele Prajapati   2/20/2023  9:15 AM SCHEDULE, [facility]

## 2023-02-13 NOTE — PROGRESS NOTES
Chief Complaint   Patient presents with    Post-Op Check     Left lateral tib plat ORIF w/ open lateral meniscus repair 2/6/2023. Patient here today with complaints of extreme pain and increased swelling. Patient states the pain began over the weekend but does not recall any event to cause the increase in pain and swelling. OP:SURGEON: Dr. Andrew Miranda DO  DATE OF PROCEDURE: 2/5/23  PROCEDURE:Left lateral tibial plateau fracture open reduction with internal fixation  Left knee lateral arthrotomy with open lateral meniscus repair    Subjective:  Hamilton Zapata is approximately 1 wk from surgery. He is NWB L LE and has worn hinged knee brace set to 0-30 degrees. [facility] Comes in today complaining of unontrolled pain. He is taking ibuprofen and percocet, but needs refills. States he has burning pain throughout the leg. Also states he has L calf pain. Denies CP and SOB. Denies fever and chills. Review of Systems -  all pertinent positives and negatives in HPI. Objective:    General: Alert and oriented X 3, normocephalic atraumatic, external ears and eye normal, sclera clear, no acute distress, respirations easy and unlabored with no audible wheezes, skin warm and dry, speech and dress appropriate for noted age, affect euthymic. Extremity:  Left Lower Extremity  Skin clean dry and intact, without signs of infection  Incisions well approximated without signs of redness, warmth or drainage- sutures intact  Mild edema noted throughout the knee, leg and ankle   Compartments supple throughout thigh and leg  Calf supple and TENDER TO LIGHT PALPATION, no induration or erythema   Demonstrates active knee flexion/extension, ankle plantar/dorsiflexion/great toe extension. States sensation intact to touch in sural/deep peroneal/superficial peroneal/saphenous/posterior tibial nerve distributions to foot/ankle.    Palpable dorsalis pedis and posterior tibialis pulses, cap refill brisk in toes, foot warm/perfused. XR:   Multiple views of L knee and tib fib demonstrating s/p ORIF for L tibial plateau fracture. Hardware remains intact without interval displacement, loosening, or failure. No significant change in alignment. No acute fractures or dislocations or any other osseus abnormality identified. Assessment:   Diagnosis Orders   1. Pain of left calf  US DUP LOWER EXTREMITY LEFT YESSENIA      2. Tibial plateau fracture, left, closed, initial encounter  oxyCODONE-acetaminophen (PERCOCET) 5-325 MG per tablet    acetaminophen (TYLENOL) 500 MG tablet    gabapentin (NEURONTIN) 100 MG capsule    methocarbamol (ROBAXIN-750) 750 MG tablet    ibuprofen (ADVIL;MOTRIN) 800 MG tablet    US DUP LOWER EXTREMITY LEFT YESSENIA    [place]:  Reviewed x-rays with patient today in office   Sutures to remain intact until next week's OV  WB:  Non-weight bearing left lower extremity- use assistive devices if needed  Therapy: HHC ordered today  Multimodal pain control, see above. Call for refills if needed  DVT Prophylaxis: Continue with  ASA 325mg  as ordered  STAT L LE venous US ordered to r/o DVT - NEGATIVE for DVT      Future Appointments   Date Time Provider Adele Prajapati   2/20/2023  9:15 AM SCHEDULE, [facility]           Controlled Substance Monitoring:    Acute and Chronic Pain Monitoring:   RX Monitoring 2/13/2023   Periodic Controlled Substance Monitoring No signs of potential drug abuse or diversion identified. Electronically signed by Norma Mota PA-C on 2/13/2023 at 11:53 AM  Note: This report was completed using [employer] voiced recognition software. Every effort has been made to ensure accuracy; however, inadvertent computerized transcription errors may be present.

## 2023-02-13 NOTE — PROGRESS NOTES
Call placed to [address] [facility], spoke to Thomasina Apgar, advised OK to put patient on schedule today for stat US. Call placed to scheduling, spoke to Margot. US scheduled. Assisted patient to registration at this time.

## 2023-02-13 NOTE — TELEPHONE ENCOUNTER
Call placed to patient on two phone numbers  [phone] and [phone]. Both went straight to Voicemail. Per providers, call placed to patient, VMs left, appointment made at this time. Future Appointments   Date Time Provider Adele Prajapati   2/13/2023 11:15 AM SCHEDULE, [facility]   2/20/2023  9:15 AM SCHEDULE, [facility]     Provided office phone number and address in both Voicemails.

## 2023-02-17 DIAGNOSIS — S82.142A TIBIAL PLATEAU FRACTURE, LEFT, CLOSED, INITIAL ENCOUNTER: Primary | ICD-10-CM

## 2023-02-19 NOTE — DISCHARGE SUMMARY
Physician Discharge Summary     Patient ID:  Tima Aaron  [id number]  12 y.o.  1985    Admit date: 2/5/2023    Discharge date and time: 2/6/2023  6:37 PM     Admitting Physician: Radha Andujar DO     Discharge Physician: Radha Andujar DO    Admission Diagnoses: Tibial plateau fracture, left, closed, initial encounter [S82.142A]  Closed fracture of left tibial plateau, initial encounter [S82.142A]    Discharge Diagnoses: s/p left lateral tibial plateau fracture    Admission Condition: stable    Discharged Condition: stable    Hospital Course: The patient was admitted from the Emergency room. After examination, review of radiologic studies, and appropriate pre-operative risk assessment, it was recommended by Radha Andujar DO to undergo open reduction internal fixation and lateral arthrotomy with open lateral meniscus repair of the left knee. The patient underwent an uneventful course of left lateral tibial plateau open reduction internal fixation and left knee lateral arthrotomy with lateral meniscus repair by Radha Andujar DO on 2/5/2023. The patient was subsequently taken to the PACU and the floor in stable condition. The patient was continued on antibiotics for 24 hours post-operatively as they received a dose of antibiotics pre-operatively as well. The patient was started on DVT prophylaxis and physical therapy with instructions to be NWB.  was consulted for d/c planning. On same day of surgery patient insisted on discharge to home. the patient was discharged to home in stable condition. Treatments: s/p   Left lateral tibial plateau fracture open reduction with internal fixation  Left knee lateral arthrotomy with open lateral meniscus repair    Disposition: Patient was discharge to home in stable condition.  The patient was provided instructions to continue DVT prophylaxis as instructed, pain medication as prescribed, and to continue daily dry sterile dressing changes until wound is dry. The pt was allowed to shower on POD 4. The patient was to follow up with Gurpreet Rinaldi DO in 2 weeks, and to call the office for an appointment. suture (s) were to be removed on within 2 weeks. Medication List        ASK your doctor about these medications      cephALEXin 500 MG capsule  Commonly known as: KEFLEX  Take 1 capsule by mouth 4 times daily for 3 days  Ask about: Should I take this medication?                Where to Get Your Medications        These medications were sent to [facility] [address] [address] [address], [facility] [address] [facility] [phone] [facility] [phone] [address], [facility] [address]      Phone: [phone]   cephALEXin 500 MG capsule         Signed:  Tawanda Ferris DO  2/19/2023  9:35 AM

## 2023-02-20 ENCOUNTER — OFFICE VISIT (OUTPATIENT)
Dept: ORTHOPEDIC SURGERY | Age: 38
End: 2023-02-20
Payer: MEDICAID

## 2023-02-20 ENCOUNTER — HOSPITAL ENCOUNTER (OUTPATIENT)
Dept: GENERAL RADIOLOGY | Age: 38
Discharge: HOME | End: 2023-02-22
Payer: MEDICAID

## 2023-02-20 VITALS — BODY MASS INDEX: 25.06 KG/M2 | WEIGHT: 185 LBS | HEIGHT: 72 IN

## 2023-02-20 DIAGNOSIS — S82.142A TIBIAL PLATEAU FRACTURE, LEFT, CLOSED, INITIAL ENCOUNTER: ICD-10-CM

## 2023-02-20 DIAGNOSIS — S82.142A TIBIAL PLATEAU FRACTURE, LEFT, CLOSED, INITIAL ENCOUNTER: Primary | ICD-10-CM

## 2023-02-20 PROCEDURE — 73560 X-RAY EXAM OF KNEE 1 OR 2: CPT

## 2023-02-20 PROCEDURE — 99024 POSTOP FOLLOW-UP VISIT: CPT | Performed by: PHYSICIAN ASSISTANT

## 2023-02-20 PROCEDURE — 99213 OFFICE O/P EST LOW 20 MIN: CPT | Performed by: ORTHOPAEDIC SURGERY

## 2023-02-20 RX ORDER — OXYCODONE HYDROCHLORIDE AND ACETAMINOPHEN 5; 325 MG/1; MG/1
1 TABLET ORAL EVERY 6 HOURS PRN
Qty: 28 TABLET | Refills: 0 | Status: SHIPPED | OUTPATIENT
Start: 2023-02-20 | End: 2023-02-27

## 2023-02-20 NOTE — PATIENT INSTRUCTIONS
Nonweightbearing left lower extremity. If Steri-Strips do not fall off left lower extremity incision in 7 days on their own, okay to remove. Continue aspirin for DVT prophylaxis. Okay to remove hinged ROM left knee brace for hygiene and therapy    Patient will be set up for home therapy following the tibial plateau fracture ORIF protocol. Start adjusting brace increasing 10 degrees of flexion per week. Follow-up in 4 weeks for reevaluation and x-rays. Call if any questions or concerns.

## 2023-02-20 NOTE — PROGRESS NOTES
Chief Complaint   Patient presents with    Post-Op Check     2 weeks out left lateral tibial plateau fracture ORIF, Left knee lateral arthrotomy with open lateral meniscus repair        OP:SURGEON: Dr. Amalia Ross, DO  DATE OF PROCEDURE: 2-6-23  PROCEDURE: 1. Left lateral tibial plateau fracture open reduction with internal fixation  2. Left knee lateral arthrotomy with open lateral meniscus repair     POD: 2 weeks    Subjective:  Eliazar Rosado is following up from the above surgery. He is NWB on left lower extremity. He ambulates with assistive device, crutches. Pain to extremity is none and is  taking prescribed pain medication, Percocet. They denies numbness or tingling to the left lower extremity. Denies calf pain, chest pain, or shortness of breath. Patient continues to use DVT prophylaxis,  mg BID. Patient is not participating in therapy at this time. He is doing well after surgery. He is wearing the hinged ROM brace unlocked 0-30. He was given Neurontin after surgery but states he has not taken it because he does not need it for nerve symptoms. Review of Systems -  All pertinent positives/negative in HPI     Objective:    General: Alert and oriented X 3, normocephalic atraumatic, external ears and eye normal, sclera clear, no acute distress, respirations easy and unlabored with no audible wheezes, skin warm and dry, speech and dress appropriate for noted age, affect euthymic. Extremity:  Left Lower Extremity  Skin clean dry and intact, without signs of infection   Incision well-healed, sutures were removed and Steri-Strips applied  mild edema noted about the knee and lower leg areas  Compartments supple throughout thigh and leg  Calf supple and not tender  negative Homans  Demonstrates active knee ROM 0-40  States sensation intact to touch in sural, deep peroneal, superficial peroneal, saphenous, posterior tibial  nerve distributions to foot/ankle.   Palpable dorsalis pedis and posterior tibialis pulses, cap refill brisk in toes, foot warm/perfused. Ht 6' (1.829 m)   Wt 185 lb (83.9 kg)   BMI 25.09 kg/m²     XR:   2 views left knee demonstrate ORIF left lateral tibial plateau fracture with hardware in stable position and alignment. No evidence of hardware loosening or failure. Assessment:   Diagnosis Orders   1. Tibial plateau fracture, left, closed, initial encounter [L80.907G (ICD-10-CM)]          Plan:  X-rays reviewed and discussed. Nonweightbearing left lower extremity. If Steri-Strips do not fall off left lower extremity incision in 7 days on their own, okay to remove. Continue aspirin for DVT prophylaxis. Okay to remove hinged ROM left knee brace for hygiene and therapy    Patient will be set up for home therapy following the tibial plateau fracture ORIF protocol. Start adjusting brace increasing 10 degrees of flexion per week. Follow-up in 4 weeks for reevaluation and x-rays. Call if any questions or concerns. Electronically signed by FAHEEM Jane on 2/20/2023 at 12:01 PM  Note: This report was completed using [employer] voiced recognition software. Every effort has been made to ensure accuracy; however, inadvertent computerized transcription errors may be present.

## 2023-02-27 DIAGNOSIS — S82.142A TIBIAL PLATEAU FRACTURE, LEFT, CLOSED, INITIAL ENCOUNTER: ICD-10-CM

## 2023-02-27 RX ORDER — OXYCODONE HYDROCHLORIDE AND ACETAMINOPHEN 5; 325 MG/1; MG/1
1 TABLET ORAL EVERY 8 HOURS PRN
Qty: 21 TABLET | Refills: 0 | Status: SHIPPED | OUTPATIENT
Start: 2023-02-27 | End: 2023-03-06

## 2023-02-27 NOTE — TELEPHONE ENCOUNTER
Patient left a voice message requesting refill on Norco    OP:SURGEON: Dr. Barbi Renteria, DO  DATE OF PROCEDURE: 2-6-23  PROCEDURE: 1. Left lateral tibial plateau fracture open reduction with internal fixation  2. Left knee lateral arthrotomy with open lateral meniscus repair     POD: 2 weeks    Last Office Visit: 2/20/2023    Medication pended and routed to providers for decision and signature.     Future Appointments   Date Time Provider Adele Prajapati   3/20/2023 11:15 AM SCHEDULE, [facility]  [facility]th[facility]

## 2023-02-27 NOTE — TELEPHONE ENCOUNTER
Controlled Substance Monitoring:    Acute and Chronic Pain Monitoring:   RX Monitoring 2/27/2023   Periodic Controlled Substance Monitoring No signs of potential drug abuse or diversion identified.

## 2023-02-28 NOTE — TELEPHONE ENCOUNTER
Received call from pateint requesting refill of Aspirin. OP:SURGEON: Dr. Domingo Alcaraz, DO  DATE OF PROCEDURE: 2-6-23  PROCEDURE: 1. Left lateral tibial plateau fracture open reduction with internal fixation  2. Left knee lateral arthrotomy with open lateral meniscus repair    Last OV: 2/20/2023    Medication pended and routed to providers for decision and signature.     Future Appointments   Date Time Provider Adele Prajapati   3/20/2023 11:15 AM SCHEDULE, [facility]

## 2023-03-08 DIAGNOSIS — S82.142A TIBIAL PLATEAU FRACTURE, LEFT, CLOSED, INITIAL ENCOUNTER: ICD-10-CM

## 2023-03-08 NOTE — TELEPHONE ENCOUNTER
Received call from pateint requesting refill of Percocet, Acetaminophen and Robaxin. OP:SURGEON: Dr. Aarti Goodman, DO  DATE OF PROCEDURE: 2-6-23  PROCEDURE: 1. Left lateral tibial plateau fracture open reduction with internal fixation  2. Left knee lateral arthrotomy with open lateral meniscus repair     Last OV: 2/20/2023     Medication pended and routed to providers for decision and signature.             Future Appointments   Date Time Provider Adele Prajapati   3/20/2023 11:15 AM SCHEDULE, [facility]

## 2023-03-09 RX ORDER — OXYCODONE HYDROCHLORIDE AND ACETAMINOPHEN 5; 325 MG/1; MG/1
1 TABLET ORAL EVERY 8 HOURS PRN
Qty: 21 TABLET | Refills: 0 | Status: SHIPPED | OUTPATIENT
Start: 2023-03-09 | End: 2023-03-16

## 2023-03-09 RX ORDER — METHOCARBAMOL 750 MG/1
750 TABLET, FILM COATED ORAL 3 TIMES DAILY
Qty: 90 TABLET | Refills: 0 | Status: SHIPPED | OUTPATIENT
Start: 2023-03-09 | End: 2023-04-08

## 2023-03-09 RX ORDER — ACETAMINOPHEN 500 MG
1000 TABLET ORAL 2 TIMES DAILY PRN
Qty: 120 TABLET | Refills: 1 | Status: SHIPPED | OUTPATIENT
Start: 2023-03-09

## 2023-03-09 NOTE — TELEPHONE ENCOUNTER
Robaxin, acetaminophen, Percocet refilled. Percocet every 8 hours as needed currently, will try to wean at next refill. Controlled Substance Monitoring:    Acute and Chronic Pain Monitoring:   RX Monitoring 3/9/2023   Periodic Controlled Substance Monitoring No signs of potential drug abuse or diversion identified.

## 2023-03-14 DIAGNOSIS — S82.142A TIBIAL PLATEAU FRACTURE, LEFT, CLOSED, INITIAL ENCOUNTER: ICD-10-CM

## 2023-03-14 RX ORDER — OXYCODONE HYDROCHLORIDE AND ACETAMINOPHEN 5; 325 MG/1; MG/1
1 TABLET ORAL EVERY 12 HOURS PRN
Qty: 14 TABLET | Refills: 0 | Status: SHIPPED | OUTPATIENT
Start: 2023-03-16 | End: 2023-03-23

## 2023-03-14 NOTE — TELEPHONE ENCOUNTER
Received call from pateint requesting refill of Percocet. OP:SURGEON: Dr. Liz Crain, DO  DATE OF PROCEDURE: 2-6-23  PROCEDURE: 1. Left lateral tibial plateau fracture open reduction with internal fixation  2. Left knee lateral arthrotomy with open lateral meniscus repair     Last OV: 2/20/2023     Medication pended and routed to providers for decision and signature.                  Future Appointments   Date Time Provider Adele Prajapati   3/20/2023 11:15 AM SCHEDULE, [facility] SE [facility]

## 2023-03-14 NOTE — TELEPHONE ENCOUNTER
Percocet refilled only to twice daily as needed with first fill date on 3/16    Controlled Substance Monitoring:    Acute and Chronic Pain Monitoring:   RX Monitoring 3/14/2023   Periodic Controlled Substance Monitoring No signs of potential drug abuse or diversion identified.

## 2023-03-20 DIAGNOSIS — S82.142A TIBIAL PLATEAU FRACTURE, LEFT, CLOSED, INITIAL ENCOUNTER: Primary | ICD-10-CM

## 2023-03-23 DIAGNOSIS — S82.142A TIBIAL PLATEAU FRACTURE, LEFT, CLOSED, INITIAL ENCOUNTER: ICD-10-CM

## 2023-03-23 NOTE — TELEPHONE ENCOUNTER
Received call from pateint requesting refill of Percocet. OP:SURGEON: Dr. Flako Driscoll,   DATE OF PROCEDURE: 2-6-23  PROCEDURE: 1. Left lateral tibial plateau fracture open reduction with internal fixation  2. Left knee lateral arthrotomy with open lateral meniscus repair     Last OV: 2/20/2023  Patient \"NO SHOW\" for appointment on 3/20/2023. Medication pended and routed to providers for decision and signature. No future appointments.

## 2023-03-24 RX ORDER — OXYCODONE HYDROCHLORIDE AND ACETAMINOPHEN 5; 325 MG/1; MG/1
1 TABLET ORAL DAILY PRN
Qty: 7 TABLET | Refills: 0 | Status: SHIPPED | OUTPATIENT
Start: 2023-03-24 | End: 2023-03-31

## 2023-03-24 NOTE — TELEPHONE ENCOUNTER
Percocet refilled and weaned to once daily prn. This is last refill of narcotics. Controlled Substance Monitoring:    Acute and Chronic Pain Monitoring:   RX Monitoring 3/24/2023   Periodic Controlled Substance Monitoring No signs of potential drug abuse or diversion identified.

## 2023-03-27 ENCOUNTER — TELEPHONE (OUTPATIENT)
Dept: ORTHOPEDIC SURGERY | Age: 38
End: 2023-03-27

## 2023-03-27 NOTE — TELEPHONE ENCOUNTER
Vm from pt requesting appt/ stating PT recommended to call us and schedule an appt.     Call to pt lvm appt missed on 3/20/23, r/s'd   Future Appointments   Date Time Provider Adele Prajapati   4/6/2023  8:00 AM DO JOSE Gunn [facility]

## 2023-03-27 NOTE — TELEPHONE ENCOUNTER
Vm from pt requesting appt. Call back to pt advised appt scheduled.    Future Appointments   Date Time Provider Adele Prajapati   4/6/2023  8:00 AM [address] [place] [address] office location

## 2023-03-29 DIAGNOSIS — S82.142A TIBIAL PLATEAU FRACTURE, LEFT, CLOSED, INITIAL ENCOUNTER: ICD-10-CM

## 2023-03-29 RX ORDER — ACETAMINOPHEN 500 MG
1000 TABLET ORAL 3 TIMES DAILY PRN
Qty: 120 TABLET | Refills: 1 | Status: SHIPPED | OUTPATIENT
Start: 2023-03-29

## 2023-03-29 NOTE — TELEPHONE ENCOUNTER
Patient ok for tylenol for pain control. If he would like to change location of ibuprofen prescription, on [facility] patient would only pay $4 for ibuprofen at [facility].      Pain management referral placed   Electronically signed by Damon Santiago PA-C on 3/29/2023 at 7:30 PM

## 2023-03-29 NOTE — TELEPHONE ENCOUNTER
Received call from patient requesting refill of Tylenol and Percocet. Per chart review, refill of Percocet on 3/23/2023 was final fill. Call placed to patient at this time and informed that Percocet cannot be filled. Patient concerned as he is in a lot of pain still and that he was unable to get the Ibuprofen 800mg  due to high insurance co-pay. Discussed with patient the option of pain management. Patient agreeable to this. OP:SURGEON: Dr. Sagrario Mueller DO  DATE OF PROCEDURE: 2-6-23  PROCEDURE: 1. Left lateral tibial plateau fracture open reduction with internal fixation  2. Left knee lateral arthrotomy with open lateral meniscus repair     Last OV: 2/20/2023  Patient \"NO SHOW\" for appointment on 3/20/2023    Medication and referral pended and routed to providers for decision and signature.     Future Appointments   Date Time Provider Adele Prajapati   4/6/2023  8:00 AM Jany Anne DO [facility]

## 2023-03-30 NOTE — TELEPHONE ENCOUNTER
Call placed to patient at this time. Informed patient of the findings from providers with [facility] regarding the Ibuprofen. Patient states that he will continue the Tylenol for now. Patient instructed to contact office for Ibuprofen if he changes his mind. Patient verbalized understanding.     Future Appointments   Date Time Provider Adele Prajapati   4/6/2023  8:00 AM Cristiano Mauricio DO [facility]

## 2023-04-06 ENCOUNTER — OFFICE VISIT (OUTPATIENT)
Dept: ORTHOPEDIC SURGERY | Age: 38
End: 2023-04-06
Payer: MEDICAID

## 2023-04-06 ENCOUNTER — HOSPITAL ENCOUNTER (OUTPATIENT)
Dept: GENERAL RADIOLOGY | Age: 38
Discharge: HOME | End: 2023-04-08
Payer: MEDICAID

## 2023-04-06 VITALS — BODY MASS INDEX: 23.43 KG/M2 | HEIGHT: 72 IN | WEIGHT: 173 LBS

## 2023-04-06 DIAGNOSIS — S83.252D BUCKET HANDLE TEAR OF LATERAL MENISCUS OF LEFT KNEE, UNSPECIFIED WHETHER OLD OR CURRENT TEAR, SUBSEQUENT ENCOUNTER: ICD-10-CM

## 2023-04-06 DIAGNOSIS — S82.142A TIBIAL PLATEAU FRACTURE, LEFT, CLOSED, INITIAL ENCOUNTER: ICD-10-CM

## 2023-04-06 DIAGNOSIS — S82.142A TIBIAL PLATEAU FRACTURE, LEFT, CLOSED, INITIAL ENCOUNTER: Primary | ICD-10-CM

## 2023-04-06 PROCEDURE — 99212 OFFICE O/P EST SF 10 MIN: CPT

## 2023-04-06 PROCEDURE — 73560 X-RAY EXAM OF KNEE 1 OR 2: CPT

## 2023-04-06 PROCEDURE — 99024 POSTOP FOLLOW-UP VISIT: CPT | Performed by: PHYSICIAN ASSISTANT

## 2023-04-06 RX ORDER — METHOCARBAMOL 750 MG/1
750 TABLET, FILM COATED ORAL 3 TIMES DAILY
Qty: 90 TABLET | Refills: 0 | Status: SHIPPED | OUTPATIENT
Start: 2023-04-06 | End: 2023-05-06

## 2023-04-06 NOTE — PATIENT INSTRUCTIONS
Continue nonweightbearing left lower extremity. Continue aspirin for DVT prophylaxis. Patient will be set up for pain management. Robaxin will be sent to your pharmacy. Continue hinged knee brace. Okay to remove brace for hygiene, therapy, knee range of motion exercises and when sitting or lying around her house. Follow-up in 3 weeks for reevaluation, x-rays and possible progression of weightbearing status. Call if any questions or concerns.

## 2023-04-06 NOTE — PROGRESS NOTES
in toes, foot warm/perfused. Ht 6' (1.829 m)   Wt 173 lb (78.5 kg)   BMI 23.46 kg/m²     XR:   2 views left knee demonstrate ORIF left lateral tibial plateau fracture with plate and screws in stable position and alignment. No evidence of hardware loosening or failure. Assessment:   Diagnosis Orders   1. Tibial plateau fracture, left, closed, subsequent encounter        2. Bucket handle tear of lateral meniscus of left knee, unspecified whether old or current tear, subsequent encounter          Plan:  Xrays reviewed with patient. Plan of care discussed in detail, all questions sought and answered to patients satisfaction at this time. Continue nonweightbearing left lower extremity. Continue aspirin for DVT prophylaxis. Patient will be set up for pain management. Robaxin will be sent to your pharmacy. Continue hinged knee brace. Okay to remove brace for hygiene, therapy, knee range of motion exercises and when sitting or lying around her house. Follow-up in 3 weeks for reevaluation, x-rays and possible progression of weightbearing status. Call if any questions or concerns. Electronically signed by FAHEEM Dickinson on 4/6/2023 at 8:43 AM  Note: This report was completed using [employer] voiced recognition software. Every effort has been made to ensure accuracy; however, inadvertent computerized transcription errors may be present.

## 2023-04-20 ENCOUNTER — TELEPHONE (OUTPATIENT)
Dept: ORTHOPEDIC SURGERY | Age: 38
End: 2023-04-20

## 2023-04-20 NOTE — TELEPHONE ENCOUNTER
[facility]. Spoke with Sushant Betts. Patient was prescribed Robaxin 750 mg, 1 tablet by mouth TID on 4-6-2023. Wendie verbalized understanding.

## 2023-04-20 NOTE — TELEPHONE ENCOUNTER
Durag Paul, Mother of the patient called in. Patient is currently at [facility]. Patient was prescribed pain medication by Dr. Latonya Conklin on 4-6-2023 due to Left Tibial Plateau ORIF vs Ex-Fix surgery on 2-6-2023. Patient removed the label from his prescription bottle. When the patient arrived to [facility] recently, the facility stated the medication was not in the original bottle so they could not confirm what mediation it is. Mother is asking if the Medical Department at [facility] can be contacted regarding the name of the pain mediation. Informed the mother I can not provide any information to the patient directly if he was to call into our office.

## 2023-04-27 DIAGNOSIS — S82.142A TIBIAL PLATEAU FRACTURE, LEFT, CLOSED, INITIAL ENCOUNTER: Primary | ICD-10-CM

## 2023-05-08 ENCOUNTER — TELEPHONE (OUTPATIENT)
Dept: ORTHOPEDIC SURGERY | Age: 38
End: 2023-05-08

## 2023-05-08 NOTE — TELEPHONE ENCOUNTER
Patient called stating he needs an appointment.        L lateral tib plateau ORIF w/ open lateral meniscus repair 2/6/23     Routed to providers for scheduling

## 2023-05-08 NOTE — TELEPHONE ENCOUNTER
Patient last seen at 8.5 weeks post op from:  SURGEON: Dr. Kassy Dawson DO  DATE OF PROCEDURE: 2-6-23  PROCEDURE: 1. Left lateral tibial plateau fracture open reduction with internal fixation  2. Left knee lateral arthrotomy with open lateral meniscus repair     He is now 3 months post op, can be scheduled within the next 2-3 weeks  Please confirm whether or not patient is weightbearing on LLE?   Electronically signed by Laz Moran PA-C on 5/8/2023 at 2:56 PM

## 2023-05-09 NOTE — TELEPHONE ENCOUNTER
Called patient back and scheduled for follow up appointment. Patient did report that he has been partially WB on the LLE and advancing brace on his own.      Future Appointments   Date Time Provider Adele Prajapati   5/25/2023  9:30 AM Claire Villeda DO [facility]

## 2023-05-25 ENCOUNTER — OFFICE VISIT (OUTPATIENT)
Dept: ORTHOPEDIC SURGERY | Age: 38
End: 2023-05-25
Payer: MEDICAID

## 2023-05-25 ENCOUNTER — HOSPITAL ENCOUNTER (OUTPATIENT)
Dept: GENERAL RADIOLOGY | Age: 38
Discharge: HOME | End: 2023-05-27
Payer: MEDICAID

## 2023-05-25 DIAGNOSIS — S83.252D BUCKET HANDLE TEAR OF LATERAL MENISCUS OF LEFT KNEE, UNSPECIFIED WHETHER OLD OR CURRENT TEAR, SUBSEQUENT ENCOUNTER: ICD-10-CM

## 2023-05-25 DIAGNOSIS — S82.142A TIBIAL PLATEAU FRACTURE, LEFT, CLOSED, INITIAL ENCOUNTER: Primary | ICD-10-CM

## 2023-05-25 DIAGNOSIS — S82.142A TIBIAL PLATEAU FRACTURE, LEFT, CLOSED, INITIAL ENCOUNTER: ICD-10-CM

## 2023-05-25 PROCEDURE — G8427 DOCREV CUR MEDS BY ELIG CLIN: HCPCS | Performed by: PHYSICIAN ASSISTANT

## 2023-05-25 PROCEDURE — G8420 CALC BMI NORM PARAMETERS: HCPCS | Performed by: PHYSICIAN ASSISTANT

## 2023-05-25 PROCEDURE — 73562 X-RAY EXAM OF KNEE 3: CPT

## 2023-05-25 PROCEDURE — 99213 OFFICE O/P EST LOW 20 MIN: CPT | Performed by: PHYSICIAN ASSISTANT

## 2023-05-25 PROCEDURE — 1036F TOBACCO NON-USER: CPT | Performed by: PHYSICIAN ASSISTANT

## 2023-05-25 PROCEDURE — 99212 OFFICE O/P EST SF 10 MIN: CPT

## 2023-05-25 RX ORDER — ASPIRIN 81 MG/1
81 TABLET ORAL 2 TIMES DAILY
Qty: 60 TABLET | Refills: 1 | Status: SHIPPED | OUTPATIENT
Start: 2023-05-25

## 2023-05-25 NOTE — PATIENT INSTRUCTIONS
Start progressive weightbearing on the left lower extremity in Hinged ROM Brace. Start with 25% of body weight for 7 days and if tolerating well, no significant increased pain or swelling ok to progress to 50% of body weight in Hinged ROM Brace for 7 days. Then to 75% of body weight for 7 days, then to 100% on left lower extremity in the Hinged ROM Brace. Once full weight bearing in the Hinged ROM Brace for 1-2 weeks and tolerating well OK to start weaning out of Hinged ROM Brace. At this point, brace only needs to be worn when weightbearing. Patient will be set up for PT at [address] [facility] on [facility]. Aspirin 81 mg twice daily will be sent to your pharmacy. Follow-up in 6-8 weeks for reevaluation and x-rays. Call if any questions or concerns.

## 2023-05-25 NOTE — PROGRESS NOTES
brisk in toes, foot warm/perfused. There were no vitals taken for this visit. XR:   3 views left knee demonstrate ORIF left lateral tibial plateau fracture with hardware in stable position and alignment. No evidence of hardware loosening or failure. Assessment:   Diagnosis Orders   1. Tibial plateau fracture, left, closed, initial encounter        2. Bucket handle tear of lateral meniscus of left knee, unspecified whether old or current tear, subsequent encounter          Plan:  Xrays reviewed with patient. Plan of care discussed in detail, all questions sought and answered to patients satisfaction at this time. Start progressive weightbearing on the left lower extremity in Hinged ROM Brace. Start with 25% of body weight for 7 days and if tolerating well, no significant increased pain or swelling ok to progress to 50% of body weight in Hinged ROM Brace for 7 days. Then to 75% of body weight for 7 days, then to 100% on left lower extremity in the Hinged ROM Brace. Once full weight bearing in the Hinged ROM Brace for 1-2 weeks and tolerating well OK to start weaning out of Hinged ROM Brace. At this point, brace only needs to be worn when weightbearing. Patient will be set up for PT at [facility] on [facility]. Aspirin 81 mg twice daily will be sent to your pharmacy. Follow-up in 6-8 weeks for reevaluation and x-rays. Call if any questions or concerns. Electronically signed by FAHEEM Felder on 5/25/2023 at 10:24 AM  Note: This report was completed using [employer] voiced recognition software. Every effort has been made to ensure accuracy; however, inadvertent computerized transcription errors may be present.

## 2023-06-01 ENCOUNTER — HOSPITAL ENCOUNTER (OUTPATIENT)
Dept: PHYSICAL THERAPY | Age: 38
Setting detail: THERAPIES SERIES
Discharge: HOME | End: 2023-06-01

## 2023-06-01 NOTE — PROGRESS NOTES
[address] [facility]                Phone: [phone]  Fax: [phone]    Physical Therapy  Cancellation/No-show Note  Patient Name:  Gege Alexandra  :  1985   Date:  2023    For today's appointment patient:  []  Cancelled  []  Rescheduled appointment  [x]  No-show     Reason given by patient:  []  Patient ill  []  Conflicting appointment  []  No transportation    []  Conflict with work  []  No reason given  []  Other:     Comments:      Electronically signed by:  Gonzalo Montes, PT

## 2023-07-10 DIAGNOSIS — S82.142A TIBIAL PLATEAU FRACTURE, LEFT, CLOSED, INITIAL ENCOUNTER: Primary | ICD-10-CM

## 2023-07-24 NOTE — PROGRESS NOTES
[address]                Phone: [phone]  Fax: [phone]    Physical Therapy  Cancellation/No-show Note  Patient Name:  Romi Bernstein  :  1985   Date:  2023    For today's appointment patient:  []  Cancelled  []  Rescheduled appointment  [x]  No-show     Reason given by patient:  []  Patient ill  []  Conflicting appointment  []  No transportation    []  Conflict with work  [x]  No reason given  []  Other:     Comments:   pt was a no-show for initial evaluation. Electronically signed by:   Ann Hardy PT

## 2023-08-02 ENCOUNTER — HOSPITAL ENCOUNTER (OUTPATIENT)
Dept: PHYSICAL THERAPY | Age: 38
Setting detail: THERAPIES SERIES
Discharge: HOME | End: 2023-07-24

## 2023-08-15 ENCOUNTER — HOSPITAL ENCOUNTER (OUTPATIENT)
Dept: PHYSICAL THERAPY | Age: 38
Setting detail: THERAPIES SERIES
Discharge: HOME | End: 2023-08-15

## 2023-08-15 NOTE — PROGRESS NOTES
[address]                Phone: [phone]   Fax: [phone]    Physical Therapy Daily Treatment Note  Date:  8/15/2023    Patient Name:  Sandeep Powres    :  1985  MRN: [id number]    Restrictions/Precautions:  Rae Webb D/C'd few weeks ago/LT Kwaku meniscal tear/non surgical   Diagnosis:  S/P Tibial plateau Fx/repair 1/1/10,  LT bucket handle tear acute vs chronic    Insurance/Certification information:    Referring Physician:  MD Nguyễn Cat PA   Visit# / total visits:  1/10  Pain level: /10   Time In:  Time Out:    Subjective:      Exercises:  Exercise/Equipment Resistance/Repetitions Other comments   StepOne              SAQ     SLR            [employer] squats with ball            toe raises     step ups             side      TKE with flexband     3-way hip                                                           Objective:    A/Plan:     Home Exercise Program:      Manual Treatments:      Modalities:          CPT codes 8/15/2023 Units      Low Complexity PT evaluation 55186 [id number]      Moderate Complexity PT evaluation  41354      High Complexity PT evaluation [id number]      PT Re-evaluation  [id number]      Gait training [id number]      Neuromuscular reeducation  [id number]      Electrical Stimulation 43536      Manual therapy  96770      Therapeutic activities  74852      Therapeutic exercises  31867             Treatment/Activity Tolerance:  [] Patient tolerated treatment well [] Patient limited by fatique  [] Patient limited by pain  [] Patient limited by other medical complications  [] Other:     Plan:   [] Continue per plan of care [] Alter current plan (see comments)  [] Plan of care initiated [] Hold pending MD visit [] Discharge      Electronically signed by:   Hodan Marrufo PT

## 2023-08-15 NOTE — PROGRESS NOTES
[address]                Phone: [phone]  Fax: [phone]    Physical Therapy  Cancellation/No-show Note  Patient Name:  Nhung Pearson  :  1985   Date:  8/15/2023    For today's appointment patient:  []  Cancelled  []  Rescheduled appointment  [x]  No-show     Reason given by patient:  []  Patient ill  []  Conflicting appointment  []  No transportation    []  Conflict with work  [x]  No reason given  []  Other:     Comments:      Electronically signed by:   Anahi Galeas PT

## 2023-08-17 ENCOUNTER — HOSPITAL ENCOUNTER (OUTPATIENT)
Dept: PHYSICAL THERAPY | Age: 38
Setting detail: THERAPIES SERIES
Discharge: HOME | End: 2023-08-17

## 2023-08-17 NOTE — PROGRESS NOTES
[address]                Phone: [phone]  Fax: [phone]    Physical Therapy  Cancellation/No-show Note  Patient Name:  Thee Mcclain  :  1985   Date:  2023    For today's appointment patient:  []  Cancelled  []  Rescheduled appointment  [x]  No-show     Reason given by patient:  []  Patient ill  []  Conflicting appointment  []  No transportation    []  Conflict with work  [x]  No reason given  []  Other:     Comments:  pt was a no-show for both scheduled visits for the week. Electronically signed by:   Tra Rosado PT

## 2024-11-03 ENCOUNTER — HOSPITAL ENCOUNTER (EMERGENCY)
Age: 39
Discharge: HOME | End: 2024-11-03
Payer: MEDICAID

## 2024-11-03 VITALS
RESPIRATION RATE: 14 BRPM | SYSTOLIC BLOOD PRESSURE: 135 MMHG | DIASTOLIC BLOOD PRESSURE: 82 MMHG | HEART RATE: 58 BPM | HEIGHT: 72 IN | WEIGHT: 150 LBS | TEMPERATURE: 98.2 F | OXYGEN SATURATION: 100 % | BODY MASS INDEX: 20.32 KG/M2

## 2024-11-03 DIAGNOSIS — L02.511 ABSCESS OF FINGER OF RIGHT HAND: Primary | ICD-10-CM

## 2024-11-03 PROCEDURE — 6370000000 HC RX 637 (ALT 250 FOR IP): Performed by: PHYSICIAN ASSISTANT

## 2024-11-03 PROCEDURE — 90714 TD VACC NO PRESV 7 YRS+ IM: CPT | Performed by: PHYSICIAN ASSISTANT

## 2024-11-03 PROCEDURE — 90471 IMMUNIZATION ADMIN: CPT | Performed by: PHYSICIAN ASSISTANT

## 2024-11-03 PROCEDURE — 6360000002 HC RX W HCPCS: Performed by: PHYSICIAN ASSISTANT

## 2024-11-03 PROCEDURE — 99283 EMERGENCY DEPT VISIT LOW MDM: CPT

## 2024-11-03 PROCEDURE — 87205 SMEAR GRAM STAIN: CPT

## 2024-11-03 PROCEDURE — 87070 CULTURE OTHR SPECIMN AEROBIC: CPT

## 2024-11-03 PROCEDURE — 87077 CULTURE AEROBIC IDENTIFY: CPT

## 2024-11-03 RX ORDER — CEPHALEXIN 500 MG/1
500 CAPSULE ORAL ONCE
Status: COMPLETED | OUTPATIENT
Start: 2024-11-03 | End: 2024-11-03

## 2024-11-03 RX ORDER — CEPHALEXIN 500 MG/1
500 CAPSULE ORAL 4 TIMES DAILY
Qty: 28 CAPSULE | Refills: 0 | Status: SHIPPED | OUTPATIENT
Start: 2024-11-03 | End: 2024-11-10

## 2024-11-03 RX ORDER — SULFAMETHOXAZOLE AND TRIMETHOPRIM 800; 160 MG/1; MG/1
2 TABLET ORAL 2 TIMES DAILY
Qty: 28 TABLET | Refills: 0 | Status: SHIPPED | OUTPATIENT
Start: 2024-11-03 | End: 2024-11-10

## 2024-11-03 RX ORDER — CEPHALEXIN 500 MG/1
500 CAPSULE ORAL ONCE
Status: CANCELLED | OUTPATIENT
Start: 2024-11-03 | End: 2024-11-03

## 2024-11-03 RX ADMIN — CLOSTRIDIUM TETANI TOXOID ANTIGEN (FORMALDEHYDE INACTIVATED) AND CORYNEBACTERIUM DIPHTHERIAE TOXOID ANTIGEN (FORMALDEHYDE INACTIVATED) 0.5 ML: 5; 2 INJECTION, SUSPENSION INTRAMUSCULAR at 20:37

## 2024-11-03 RX ADMIN — CEPHALEXIN 500 MG: 500 CAPSULE ORAL at 20:37

## 2024-11-03 ASSESSMENT — PAIN - FUNCTIONAL ASSESSMENT: PAIN_FUNCTIONAL_ASSESSMENT: 0-10

## 2024-11-03 ASSESSMENT — PAIN SCALES - GENERAL: PAINLEVEL_OUTOF10: 0

## 2024-11-04 NOTE — ED PROVIDER NOTES
Independent ALEK Visit.      [facility]  Department of Emergency Medicine   ED  Encounter Note  Admit Date/RoomTime: 11/3/2024  8:03 PM  ED Room: 33/33    NAME: Collin Wilson  : 1985  MRN: [id number]     Chief Complaint:  Wound Infection (right hand 3rd digit, tetanus shot needs updated, on antibx from /)    History of Present Illness        Collin Wilson is a 39 y.o. old male who presents to the emergency department by private vehicle, for evaluation of abscess located on right middle finger, which occured 6 day(s) prior to arrival.  Pt reports it started with a swollen area on Monday. HE went to urgent care Tuesday and they started him on antibiotics and medrol pack. Tuesday evening the wound came to a head and he pricked the top with a needle and pus came out. The wound has remained open and he is able to continue to express pus from he wound.  The wound is / has clean, open, and small bead of pus at opening . PT does not recall specific injury. 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 He denies fever, chills, pain. Chart review shows pt was put on bactrim ds 1 tablet Q12 x 7 days.   Tetanus Status:  unknown.       ROS   Pertinent positives and negatives are stated within HPI, all other systems reviewed and are negative.    Past Medical History:  has a past medical history of Herpes genitalis in men.    Surgical History:  has a past surgical history that includes Leg Surgery (Left, 2023).    Social History:  reports that he has quit smoking. His smoking use included cigarettes. He has never used smokeless tobacco. He reports that he does not drink alcohol and does not use drugs.    Family History: family history is not on file.     Allergies: Ciprofloxacin and Penicillins    Physical Exam   Oxygen Saturation Interpretation: Normal.        ED Triage Vitals   BP Systolic BP Percentile Diastolic BP Percentile Temp Temp Source Pulse

## 2024-11-06 LAB
MICROORGANISM SPEC CULT: ABNORMAL
MICROORGANISM/AGENT SPEC: ABNORMAL
SPECIMEN DESCRIPTION: ABNORMAL

## (undated) DEVICE — DRESSING COMP W4XL4IN N ADH PD W2.5XL2.5IN GZ BORDERED ADH

## (undated) DEVICE — BIT DRL L110MM DIA2.5MM ST G QUIK CPL NONRADIOPAQUE W/O STP

## (undated) DEVICE — BANDAGE COMPR W4INXL10YD WHITE/BEIGE E MTRX HK LOOP CLSR

## (undated) DEVICE — BIT DRL L180MM DIA2.5MM G QUIK CPL W/O STP REUSE

## (undated) DEVICE — PENCIL ES L3M BTTN SWCH HOLSTER W/ BLDE ELECTRD EDGE

## (undated) DEVICE — PADDING CAST W6INXL4YD COT LO LINTING WYTEX

## (undated) DEVICE — DRESSING,GAUZE,XEROFORM,CURAD,5"X9",ST: Brand: CURAD

## (undated) DEVICE — PADDING,UNDERCAST,COTTON, 4"X4YD STERILE: Brand: MEDLINE

## (undated) DEVICE — GLOVE ORTHO 8   MSG9480

## (undated) DEVICE — BANDAGE,GAUZE,4.5"X4.1YD,STERILE,LF: Brand: MEDLINE

## (undated) DEVICE — GLOVE ORANGE PI 8   MSG9080

## (undated) DEVICE — BIT DRL L112MM DIA3.5MM ST QUIK CPL NONRADIOPAQUE W/O STP

## (undated) DEVICE — Device

## (undated) DEVICE — BIT DRL L180MM DIA2.5MM QUIK CPL NONRADIOPAQUE W/O STP

## (undated) DEVICE — LOWER EXT KNEE DRAPE: Brand: MEDLINE INDUSTRIES, INC.

## (undated) DEVICE — 1000 S-DRAPE TOWEL DRAPE 10/BX: Brand: STERI-DRAPE™

## (undated) DEVICE — DRESSING HYDROFIBER AQUACEL AG ADVANTAGE 3.5X10 IN

## (undated) DEVICE — TUBING SUCT 12FR MAL ALUM SHFT FN CAP VENT UNIV CONN W/ OBT

## (undated) DEVICE — SCREW BNE L85MM DIA3.5MM CORT S STL ST FULL THRD HEX RECESS
Type: IMPLANTABLE DEVICE | Site: TIBIA | Status: NON-FUNCTIONAL
Removed: 2023-02-06

## (undated) DEVICE — 3M™ IOBAN™ 2 ANTIMICROBIAL INCISE DRAPE 6650EZ: Brand: IOBAN™ 2

## (undated) DEVICE — BIT DRL L200MM DIA2.8MM CALIB L100MM FOR 3.5MM VA LCP PROX